# Patient Record
Sex: FEMALE | Race: WHITE | Employment: OTHER | URBAN - METROPOLITAN AREA
[De-identification: names, ages, dates, MRNs, and addresses within clinical notes are randomized per-mention and may not be internally consistent; named-entity substitution may affect disease eponyms.]

---

## 2017-06-25 ENCOUNTER — HOSPITAL ENCOUNTER (INPATIENT)
Age: 77
LOS: 4 days | Discharge: HOME | DRG: 369 | End: 2017-06-29
Attending: EMERGENCY MEDICINE | Admitting: INTERNAL MEDICINE
Payer: MEDICARE

## 2017-06-25 DIAGNOSIS — D64.9 ANEMIA, UNSPECIFIED TYPE: ICD-10-CM

## 2017-06-25 DIAGNOSIS — K92.2 GASTROINTESTINAL HEMORRHAGE, UNSPECIFIED GASTROINTESTINAL HEMORRHAGE TYPE: Primary | ICD-10-CM

## 2017-06-25 PROBLEM — N19 ACUTE PRERENAL AZOTEMIA: Status: ACTIVE | Noted: 2017-06-25

## 2017-06-25 PROBLEM — K92.1 MELENA: Status: ACTIVE | Noted: 2017-06-25

## 2017-06-25 PROBLEM — I95.9 HYPOTENSION: Status: ACTIVE | Noted: 2017-06-25

## 2017-06-25 PROBLEM — K92.0 HEMATEMESIS: Status: ACTIVE | Noted: 2017-06-25

## 2017-06-25 LAB
ALBUMIN SERPL BCP-MCNC: 2.5 G/DL (ref 3.2–4.6)
ALBUMIN SERPL BCP-MCNC: 2.8 G/DL (ref 3.2–4.6)
ALBUMIN/GLOB SERPL: 0.8 {RATIO} (ref 1.2–3.5)
ALBUMIN/GLOB SERPL: 0.9 {RATIO} (ref 1.2–3.5)
ALP SERPL-CCNC: 47 U/L (ref 50–136)
ALP SERPL-CCNC: 59 U/L (ref 50–136)
ALT SERPL-CCNC: 13 U/L (ref 12–65)
ALT SERPL-CCNC: 15 U/L (ref 12–65)
AMORPH CRY URNS QL MICRO: ABNORMAL
ANION GAP BLD CALC-SCNC: 6 MMOL/L (ref 7–16)
ANION GAP BLD CALC-SCNC: 8 MMOL/L (ref 7–16)
AST SERPL W P-5'-P-CCNC: 16 U/L (ref 15–37)
AST SERPL W P-5'-P-CCNC: 19 U/L (ref 15–37)
BACTERIA SPEC CULT: NORMAL
BACTERIA URNS QL MICRO: 0 /HPF
BASOPHILS # BLD AUTO: 0 K/UL (ref 0–0.2)
BASOPHILS # BLD: 0 % (ref 0–2)
BILIRUB SERPL-MCNC: 0.4 MG/DL (ref 0.2–1.1)
BILIRUB SERPL-MCNC: 0.4 MG/DL (ref 0.2–1.1)
BUN SERPL-MCNC: 27 MG/DL (ref 8–23)
BUN SERPL-MCNC: 30 MG/DL (ref 8–23)
CALCIUM SERPL-MCNC: 7.4 MG/DL (ref 8.3–10.4)
CALCIUM SERPL-MCNC: 7.9 MG/DL (ref 8.3–10.4)
CASTS URNS QL MICRO: 0 /LPF
CHLORIDE SERPL-SCNC: 107 MMOL/L (ref 98–107)
CHLORIDE SERPL-SCNC: 111 MMOL/L (ref 98–107)
CO2 SERPL-SCNC: 25 MMOL/L (ref 21–32)
CO2 SERPL-SCNC: 29 MMOL/L (ref 21–32)
CREAT SERPL-MCNC: 0.66 MG/DL (ref 0.6–1)
CREAT SERPL-MCNC: 0.76 MG/DL (ref 0.6–1)
CRYSTALS URNS QL MICRO: 0 /LPF
DIFFERENTIAL METHOD BLD: ABNORMAL
EOSINOPHIL # BLD: 0.1 K/UL (ref 0–0.8)
EOSINOPHIL NFR BLD: 1 % (ref 0.5–7.8)
EPI CELLS #/AREA URNS HPF: ABNORMAL /HPF
ERYTHROCYTE [DISTWIDTH] IN BLOOD BY AUTOMATED COUNT: 18 % (ref 11.9–14.6)
GLOBULIN SER CALC-MCNC: 2.8 G/DL (ref 2.3–3.5)
GLOBULIN SER CALC-MCNC: 3.7 G/DL (ref 2.3–3.5)
GLUCOSE SERPL-MCNC: 93 MG/DL (ref 65–100)
GLUCOSE SERPL-MCNC: 98 MG/DL (ref 65–100)
HCT VFR BLD AUTO: 18.3 % (ref 35.8–46.3)
HCT VFR BLD AUTO: 24.6 % (ref 35.8–46.3)
HGB BLD-MCNC: 5.9 G/DL (ref 11.7–15.4)
HGB BLD-MCNC: 7.9 G/DL (ref 11.7–15.4)
IMM GRANULOCYTES # BLD: 0.1 K/UL (ref 0–0.5)
IMM GRANULOCYTES NFR BLD AUTO: 0.9 % (ref 0–5)
INR PPP: 1.1 (ref 0.9–1.2)
LIPASE SERPL-CCNC: 133 U/L (ref 73–393)
LYMPHOCYTES # BLD AUTO: 18 % (ref 13–44)
LYMPHOCYTES # BLD: 1.7 K/UL (ref 0.5–4.6)
MCH RBC QN AUTO: 27.5 PG (ref 26.1–32.9)
MCHC RBC AUTO-ENTMCNC: 32.1 G/DL (ref 31.4–35)
MCV RBC AUTO: 85.7 FL (ref 79.6–97.8)
MONOCYTES # BLD: 0.8 K/UL (ref 0.1–1.3)
MONOCYTES NFR BLD AUTO: 9 % (ref 4–12)
MUCOUS THREADS URNS QL MICRO: 0 /LPF
NEUTS SEG # BLD: 6.8 K/UL (ref 1.7–8.2)
NEUTS SEG NFR BLD AUTO: 71 % (ref 43–78)
PLATELET # BLD AUTO: 279 K/UL (ref 150–450)
PMV BLD AUTO: 9.8 FL (ref 10.8–14.1)
POTASSIUM SERPL-SCNC: 4.2 MMOL/L (ref 3.5–5.1)
POTASSIUM SERPL-SCNC: 4.5 MMOL/L (ref 3.5–5.1)
PROT SERPL-MCNC: 5.3 G/DL (ref 6.3–8.2)
PROT SERPL-MCNC: 6.5 G/DL (ref 6.3–8.2)
PROTHROMBIN TIME: 11.7 SEC (ref 9.6–12)
RBC # BLD AUTO: 2.87 M/UL (ref 4.05–5.25)
RBC #/AREA URNS HPF: 0 /HPF
SERVICE CMNT-IMP: NORMAL
SODIUM SERPL-SCNC: 142 MMOL/L (ref 136–145)
SODIUM SERPL-SCNC: 144 MMOL/L (ref 136–145)
WBC # BLD AUTO: 9.5 K/UL (ref 4.3–11.1)
WBC URNS QL MICRO: ABNORMAL /HPF

## 2017-06-25 PROCEDURE — 81015 MICROSCOPIC EXAM OF URINE: CPT | Performed by: EMERGENCY MEDICINE

## 2017-06-25 PROCEDURE — 74011250636 HC RX REV CODE- 250/636: Performed by: INTERNAL MEDICINE

## 2017-06-25 PROCEDURE — 80053 COMPREHEN METABOLIC PANEL: CPT | Performed by: EMERGENCY MEDICINE

## 2017-06-25 PROCEDURE — 85610 PROTHROMBIN TIME: CPT | Performed by: INTERNAL MEDICINE

## 2017-06-25 PROCEDURE — 65610000001 HC ROOM ICU GENERAL

## 2017-06-25 PROCEDURE — 85018 HEMOGLOBIN: CPT | Performed by: INTERNAL MEDICINE

## 2017-06-25 PROCEDURE — 85025 COMPLETE CBC W/AUTO DIFF WBC: CPT | Performed by: EMERGENCY MEDICINE

## 2017-06-25 PROCEDURE — 86923 COMPATIBILITY TEST ELECTRIC: CPT | Performed by: INTERNAL MEDICINE

## 2017-06-25 PROCEDURE — 80053 COMPREHEN METABOLIC PANEL: CPT | Performed by: INTERNAL MEDICINE

## 2017-06-25 PROCEDURE — 74011250636 HC RX REV CODE- 250/636: Performed by: EMERGENCY MEDICINE

## 2017-06-25 PROCEDURE — P9016 RBC LEUKOCYTES REDUCED: HCPCS | Performed by: INTERNAL MEDICINE

## 2017-06-25 PROCEDURE — 86900 BLOOD TYPING SEROLOGIC ABO: CPT | Performed by: NURSE PRACTITIONER

## 2017-06-25 PROCEDURE — 36415 COLL VENOUS BLD VENIPUNCTURE: CPT | Performed by: INTERNAL MEDICINE

## 2017-06-25 PROCEDURE — 83690 ASSAY OF LIPASE: CPT | Performed by: EMERGENCY MEDICINE

## 2017-06-25 PROCEDURE — 86900 BLOOD TYPING SEROLOGIC ABO: CPT | Performed by: INTERNAL MEDICINE

## 2017-06-25 PROCEDURE — 99284 EMERGENCY DEPT VISIT MOD MDM: CPT | Performed by: EMERGENCY MEDICINE

## 2017-06-25 PROCEDURE — 77010033678 HC OXYGEN DAILY

## 2017-06-25 PROCEDURE — 96366 THER/PROPH/DIAG IV INF ADDON: CPT | Performed by: EMERGENCY MEDICINE

## 2017-06-25 PROCEDURE — 96365 THER/PROPH/DIAG IV INF INIT: CPT | Performed by: EMERGENCY MEDICINE

## 2017-06-25 PROCEDURE — 96375 TX/PRO/DX INJ NEW DRUG ADDON: CPT | Performed by: EMERGENCY MEDICINE

## 2017-06-25 PROCEDURE — 87641 MR-STAPH DNA AMP PROBE: CPT | Performed by: INTERNAL MEDICINE

## 2017-06-25 PROCEDURE — 86923 COMPATIBILITY TEST ELECTRIC: CPT | Performed by: NURSE PRACTITIONER

## 2017-06-25 PROCEDURE — 77030013131 HC IV BLD ST ICUM -A

## 2017-06-25 PROCEDURE — 77030021668 HC NEB PREFIL KT VYRM -A

## 2017-06-25 PROCEDURE — C9113 INJ PANTOPRAZOLE SODIUM, VIA: HCPCS | Performed by: EMERGENCY MEDICINE

## 2017-06-25 PROCEDURE — 81003 URINALYSIS AUTO W/O SCOPE: CPT | Performed by: EMERGENCY MEDICINE

## 2017-06-25 PROCEDURE — 36430 TRANSFUSION BLD/BLD COMPNT: CPT

## 2017-06-25 PROCEDURE — 74011250637 HC RX REV CODE- 250/637: Performed by: INTERNAL MEDICINE

## 2017-06-25 RX ORDER — SODIUM CHLORIDE 9 MG/ML
250 INJECTION, SOLUTION INTRAVENOUS AS NEEDED
Status: DISCONTINUED | OUTPATIENT
Start: 2017-06-25 | End: 2017-06-25 | Stop reason: SDUPTHER

## 2017-06-25 RX ORDER — LEVOTHYROXINE SODIUM 50 UG/1
50 TABLET ORAL
Status: DISCONTINUED | OUTPATIENT
Start: 2017-06-26 | End: 2017-06-29 | Stop reason: HOSPADM

## 2017-06-25 RX ORDER — MORPHINE SULFATE 2 MG/ML
2 INJECTION, SOLUTION INTRAMUSCULAR; INTRAVENOUS
Status: COMPLETED | OUTPATIENT
Start: 2017-06-25 | End: 2017-06-25

## 2017-06-25 RX ORDER — HYDROMORPHONE HYDROCHLORIDE 4 MG/1
4 TABLET ORAL
Status: DISCONTINUED | OUTPATIENT
Start: 2017-06-25 | End: 2017-06-26

## 2017-06-25 RX ORDER — SODIUM CHLORIDE 9 MG/ML
250 INJECTION, SOLUTION INTRAVENOUS AS NEEDED
Status: DISCONTINUED | OUTPATIENT
Start: 2017-06-25 | End: 2017-06-29 | Stop reason: HOSPADM

## 2017-06-25 RX ORDER — SODIUM CHLORIDE 0.9 % (FLUSH) 0.9 %
5-10 SYRINGE (ML) INJECTION EVERY 8 HOURS
Status: DISCONTINUED | OUTPATIENT
Start: 2017-06-25 | End: 2017-06-29 | Stop reason: HOSPADM

## 2017-06-25 RX ORDER — ONDANSETRON 2 MG/ML
4 INJECTION INTRAMUSCULAR; INTRAVENOUS
Status: DISCONTINUED | OUTPATIENT
Start: 2017-06-25 | End: 2017-06-29 | Stop reason: HOSPADM

## 2017-06-25 RX ORDER — SODIUM CHLORIDE 0.9 % (FLUSH) 0.9 %
5-10 SYRINGE (ML) INJECTION AS NEEDED
Status: DISCONTINUED | OUTPATIENT
Start: 2017-06-25 | End: 2017-06-29 | Stop reason: HOSPADM

## 2017-06-25 RX ORDER — ATORVASTATIN CALCIUM 40 MG/1
40 TABLET, FILM COATED ORAL DAILY
Status: DISCONTINUED | OUTPATIENT
Start: 2017-06-25 | End: 2017-06-29 | Stop reason: HOSPADM

## 2017-06-25 RX ORDER — ONDANSETRON 2 MG/ML
4 INJECTION INTRAMUSCULAR; INTRAVENOUS
Status: COMPLETED | OUTPATIENT
Start: 2017-06-25 | End: 2017-06-25

## 2017-06-25 RX ORDER — HYDROMORPHONE HYDROCHLORIDE 4 MG/1
4 TABLET ORAL 4 TIMES DAILY
COMMUNITY
End: 2020-05-18

## 2017-06-25 RX ORDER — PREDNISONE 5 MG/1
5 TABLET ORAL
Status: DISCONTINUED | OUTPATIENT
Start: 2017-06-26 | End: 2017-06-29 | Stop reason: HOSPADM

## 2017-06-25 RX ORDER — METOCLOPRAMIDE HYDROCHLORIDE 5 MG/ML
10 INJECTION INTRAMUSCULAR; INTRAVENOUS
Status: COMPLETED | OUTPATIENT
Start: 2017-06-25 | End: 2017-06-25

## 2017-06-25 RX ORDER — SODIUM CHLORIDE 9 MG/ML
100 INJECTION, SOLUTION INTRAVENOUS CONTINUOUS
Status: DISCONTINUED | OUTPATIENT
Start: 2017-06-25 | End: 2017-06-29 | Stop reason: HOSPADM

## 2017-06-25 RX ADMIN — SODIUM CHLORIDE 1000 ML: 900 INJECTION, SOLUTION INTRAVENOUS at 14:19

## 2017-06-25 RX ADMIN — Medication 2 MG: at 15:44

## 2017-06-25 RX ADMIN — SODIUM CHLORIDE 250 ML: 900 INJECTION, SOLUTION INTRAVENOUS at 21:58

## 2017-06-25 RX ADMIN — ONDANSETRON 4 MG: 2 INJECTION INTRAMUSCULAR; INTRAVENOUS at 20:25

## 2017-06-25 RX ADMIN — Medication 10 ML: at 16:39

## 2017-06-25 RX ADMIN — SODIUM CHLORIDE 200 ML/HR: 900 INJECTION, SOLUTION INTRAVENOUS at 19:00

## 2017-06-25 RX ADMIN — SODIUM CHLORIDE 1000 ML: 900 INJECTION, SOLUTION INTRAVENOUS at 15:52

## 2017-06-25 RX ADMIN — SODIUM CHLORIDE 80 MG: 900 INJECTION, SOLUTION INTRAVENOUS at 14:09

## 2017-06-25 RX ADMIN — Medication 10 ML: at 22:23

## 2017-06-25 RX ADMIN — HYDROMORPHONE HYDROCHLORIDE 4 MG: 4 TABLET ORAL at 23:19

## 2017-06-25 RX ADMIN — SODIUM CHLORIDE 200 ML/HR: 900 INJECTION, SOLUTION INTRAVENOUS at 16:35

## 2017-06-25 RX ADMIN — ONDANSETRON 4 MG: 2 INJECTION INTRAMUSCULAR; INTRAVENOUS at 15:43

## 2017-06-25 RX ADMIN — METOCLOPRAMIDE 10 MG: 5 INJECTION, SOLUTION INTRAMUSCULAR; INTRAVENOUS at 14:19

## 2017-06-25 NOTE — ED NOTES
Type and cross sent to lab. Patient states that she is more nauseated after Zofran and morphine. Patients BP 87/43. Dr June Cruz aware. Received verbal orders for 1000 mL normal saline bolus.

## 2017-06-25 NOTE — ED TRIAGE NOTES
Patient from home with n/v with bloody emesis and dark stools since 8 am. Hx of ulcers. 4 mg Zofran IV and 600 ml normal saline. /70. Patient on arrival reports nausea.

## 2017-06-25 NOTE — CONSULTS
[facility] Consult Note    Antony Arrieta [place] 1940       Primary GI Physician: new    Referring Physician:  Dr Barbarann Kussmaul Date:  6/25/2017    Admit Date:  6/25/2017    Chief Complaint:  Hematemesis and melena    Subjective:     History of Present Illness:  Patient is a 67 y.o. female seen in consultation at the request of Dr. Luis Fernando Wallace for evaluation of hematemesis and melena. She presented to the ER today with hematemesis which began today after the onset of melena yesterday. She notes a chronic hx of constipation for which she often takes large doses of Dulcolax; this is turn causes abdominal cramping and often, nausea and vomiting. She noted loose black stools yesterday, despite no dose of Dulcolax, and today began to vomit with red blood noted with initial emesis. She reports chronic pain and denies use of nsaids for this but does note daily use of steroids for pulmonary fibrosis. Her breathing does not seem worsened after the emesis but she does report weakness and chills. She was evaluated by the hospitalist in the ER with hgb 7.9 on arrival.  She has had no emesis or melena since arrival.  She is to receive transfusion of PRBC and is npo. She has been placed on a Protonix gtt. She denies prior hx of gi bleed. PMH:  Chronic back pain  Pulmonary fibrosis  Heart murmur  Glaucoma  Hyperlipidemia  GERD  Thyroid disease  Chronic constipation    PSH:  Past Surgical History:   Procedure Laterality Date   Abdirizak Settle SURGERY  2013    HX BACK SURGERY  July, 2015    HX CHOLECYSTECTOMY      HX HYSTERECTOMY      [facility] OTHER SURGICAL  2011    left thoroscopy--[facility]IP       Allergies: Allergies   Allergen Reactions    Codeine Nausea Only    Motrin [Ibuprofen] Nausea Only       Home Medications:  Prior to Admission medications    Medication Sig Start Date End Date Taking?  Authorizing Provider   HYDROmorphone (DILAUDID) 4 mg tablet Take 4 mg by mouth every three (3) hours as needed for Pain. Yes Sugey Gallegos MD   predniSONE (DELTASONE) 5 mg tablet Take 1 Tab by mouth daily (with breakfast). Starting Feb 14, 5mg daily x 1 week, Starting Feb 21, 5mg alternating with 2.5mg daily x 1 week; Starting Feb 28, 5mg every other day until seen back in the office. 2/15/16  Yes Ayush Victoria MD   pantoprazole (PROTONIX) 40 mg tablet Take 40 mg by mouth daily. Yes Historical Provider   levothyroxine (SYNTHROID) 75 mcg tablet Take 50 mcg by mouth Daily (before breakfast). Yes Historical Provider   atorvastatin (LIPITOR) 10 mg tablet Take  by mouth daily. Yes Historical Provider   bisacodyl (DULCOLAX, BISACODYL,) 5 mg EC tablet Take 5 mg by mouth daily as needed for Constipation. Yes Historical Provider   predniSONE (DELTASONE) 10 mg tablet Take 2 tablet daily for one week, then 1-1/2 tablet daily until she is seen 10/28/15   Page President, MD       Hospital Medications:  Current Facility-Administered Medications   Medication Dose Route Frequency    pantoprazole (PROTONIX) 80 mg in 0.9% sodium chloride 250 mL infusion  80 mg IntraVENous CONTINUOUS     Current Outpatient Prescriptions   Medication Sig    HYDROmorphone (DILAUDID) 4 mg tablet Take 4 mg by mouth every three (3) hours as needed for Pain.  predniSONE (DELTASONE) 5 mg tablet Take 1 Tab by mouth daily (with breakfast). Starting Feb 14, 5mg daily x 1 week, Starting Feb 21, 5mg alternating with 2.5mg daily x 1 week; Starting Feb 28, 5mg every other day until seen back in the office.  pantoprazole (PROTONIX) 40 mg tablet Take 40 mg by mouth daily.  levothyroxine (SYNTHROID) 75 mcg tablet Take 50 mcg by mouth Daily (before breakfast).  atorvastatin (LIPITOR) 10 mg tablet Take  by mouth daily.  bisacodyl (DULCOLAX, BISACODYL,) 5 mg EC tablet Take 5 mg by mouth daily as needed for Constipation.     predniSONE (DELTASONE) 10 mg tablet Take 2 tablet daily for one week, then 1-1/2 tablet daily until she is seen       Social History:  Social History   Substance Use Topics    Smoking status: Never Smoker    Smokeless tobacco: Never Used    Alcohol use No           Family History:  Family History   Problem Relation Age of Onset    Stroke Father        Review of Systems:  A detailed 10 system ROS is obtained, with pertinent positives as listed above. All others are negative. Diet:  npo    Objective:     Physical Exam:  Vitals:  Visit Vitals    /50    Pulse 73    Temp 97.9 °F (36.6 °C)    Resp 16    Ht 5' 1\" (1.549 m)    Wt 54.4 kg (120 lb)    SpO2 94%    BMI 22.67 kg/m2     Gen:  Pt is alert, cooperative, no acute distress  Skin:  Extremities and face reveal no rashes. Pale, sallow appearing. HEENT: Sclerae anicteric. No abnormal pigmentation of the lips. The neck is supple. Cardiovascular: Regular rate and rhythm. Audible murmurs. Respiratory:  Comfortable breathing with no accessory muscle use. Decreased breath sounds anteriorly with no wheezes, rales, or rhonchi. GI:  Abdomen nondistended, soft, and nontender. Normal active bowel sounds. No enlargement of the liver or spleen. No masses palpable. Rectal:  Deferred  Musculoskeletal:  No pitting edema of the lower legs. Neurological:  Gross memory appears intact. Patient is alert and oriented. Psychiatric:  Mood appears appropriate with judgement intact. Lymphatic:  No cervical or supraclavicular adenopathy.     Laboratory:    Recent Labs      06/25/17   1345   WBC  9.5   HGB  7.9*   HCT  24.6*   PLT  279   MCV  85.7   NA  142   K  4.2   CL  107   CO2  29   BUN  30*   CREA  0.76   CA  7.9*   GLU  98   AP  59   SGOT  16   ALT  15   TBILI  0.4   ALB  2.8*   TP  6.5   LPSE  133          Assessment:     Principal Problem:    GI bleed (6/25/2017)    Active Problems:    Hematemesis (6/25/2017)      Acute upper gastrointestinal bleeding (6/25/2017)      Melena (6/25/2017)      Hypotension (6/25/2017)        Plan:     66 yo female is seen in consultation for evaluation of a one day hx of melena, followed by onset of hematemesis today. She presented to the ER with weakness and was noted to have hgb 7.9. She denies hx of gib in the past and denies use of nsaids but does report daily steroid use of hx of pulmonary fibrosis, followed by a pulmonologist at [facility]. She has been typed and crossed for PRBC transfusion and Protonix gtt has been started. Etiology of bleeding may be Jayleen-De León tear but mucosal ulceration secondary to steroids is more likely. 1.  Agree with admission to the ICU, given hypotension with bleeding  2. Serial hgb, transfuse as needed. Will keep PRBC on hold in blood bank. 3.  Continue Protonix gtt  4. Will need EGD in evaluation; will discuss timing with Dr Mohsen Rodriguez     Patient is seen and examined in collaboration with Dr. Nadege Adkins. Assessment and plan as per Dr. Mohsen Rodriguez.   Dallas Mukherjee NP

## 2017-06-25 NOTE — IP AVS SNAPSHOT
[address] 
[phone] Patient: Leela Herbert MRN: [id number] [facility]:[birth year] You are allergic to the following Allergen Reactions Codeine Nausea Only Motrin (Ibuprofen) Nausea Only Recent Documentation Height Weight Breastfeeding? BMI Smoking Status 1.549 m 54 kg No 22.49 kg/m2 Never Smoker Emergency Contacts Name Discharge Info Relation Home Work Mobile Cleave Abigail  Spouse [3] [phone] Chiki Negrete  Spouse [3] [phone] About your hospitalization You were admitted on:  June 25, 2017 You last received care in the:  [facility] 6 MED SURG You were discharged on:  June 29, 2017 Unit phone number:  [phone] Why you were hospitalized Your primary diagnosis was:  Gi Bleed Your diagnoses also included:  Hematemesis, Acute Upper Gastrointestinal Bleeding, Melena, Hypotension, Acute Prerenal Azotemia Providers Seen During Your Hospitalizations Provider Role Specialty Primary office phone Vimal Stephens MD Attending Provider Emergency Medicine [phone] Colletta Pelton, DO Attending Provider Internal Medicine [phone] Your Primary Care Physician (PCP) Primary Care Physician Office Phone Office Fax MENA BLOOD ** None ** ** None ** Follow-up Information Follow up With Details Comments Contact Info LARRY Hoskins  On 7/11/2017 at 11:00 [facility] gastroenterology [phone] Current Discharge Medication List  
  
START taking these medications Dose & Instructions Dispensing Information Comments Morning Noon Evening Bedtime  
 docusate sodium 100 mg capsule Commonly known as:  [employer] Your next dose is: Today with supper Dose:  100 mg Take 1 Cap by mouth two (2) times a day for 90 days. Indications: constipation Quantity:  60 Cap Refills:  2  
     
   
   
  
   
  
 ondansetron 8 mg disintegrating tablet Commonly known as:  ZOFRAN ODT Your next dose is:  As needed Dose:  8 mg Take 1 Tab by mouth every eight (8) hours as needed for Nausea. Quantity:  21 Tab Refills:  0 CONTINUE these medications which have CHANGED Dose & Instructions Dispensing Information Comments Morning Noon Evening Bedtime  
 pantoprazole 40 mg tablet Commonly known as:  PROTONIX What changed:   
- when to take this 
- additional instructions Your next dose is: Today before supper Dose:  40 mg Take 1 Tab by mouth two (2) times a day. Take twice a day for 8 weeks, then drop back down to daily. Indications: Upper GI Bleed Quantity:  60 Tab Refills:  0  
     
   
   
  
   
  
 predniSONE 5 mg tablet Commonly known as:  Krystin [place] What changed:  Another medication with the same name was removed. Continue taking this medication, and follow the directions you see here. Your next dose is:  6/30 Dose:  5 mg Take 1 Tab by mouth daily (with breakfast). Starting Feb 14, 5mg daily x 1 week, Starting Feb 21, 5mg alternating with 2.5mg daily x 1 week; Starting Feb 28, 5mg every other day until seen back in the office. Quantity:  30 Tab Refills:  0 CONTINUE these medications which have NOT CHANGED Dose & Instructions Dispensing Information Comments Morning Noon Evening Bedtime DILAUDID 4 mg tablet Generic drug:  HYDROmorphone Your next dose is:  As needed Dose:  4 mg Take 4 mg by mouth every three (3) hours as needed for Pain. Refills:  0 DULCOLAX (BISACODYL) 5 mg EC tablet Generic drug:  bisacodyl Your next dose is:  As needed Dose:  5 mg Take 5 mg by mouth daily as needed for Constipation. Refills:  0  
     
   
   
   
  
 levothyroxine 75 mcg tablet Commonly known as:  SYNTHROID Your next dose is:  6/30 Dose:  50 mcg Take 50 mcg by mouth Daily (before breakfast). Refills:  0 LIPITOR 10 mg tablet Generic drug:  atorvastatin Your next dose is:  6/30 Take  by mouth daily. Refills:  0 Where to Get Your Medications Information on where to get these meds will be given to you by the nurse or doctor. ! Ask your nurse or doctor about these medications  
  docusate sodium 100 mg capsule  
 ondansetron 8 mg disintegrating tablet  
 pantoprazole 40 mg tablet Discharge Instructions DISCHARGE SUMMARY from Nurse The following personal items are in your possession at time of discharge: 
 
Dental Appliances: At home Visual Aid: Glasses, At bedside Home Medications: None Jewelry: Ring (7 rings) Clothing: Sent home Other Valuables: Cell Phone, Eyeglasses PATIENT INSTRUCTIONS: 
 
 
F-face looks uneven A-arms unable to move or move unevenly S-speech slurred or non-existent T-time-call 911 as soon as signs and symptoms begin-DO NOT go Back to bed or wait to see if you get better-TIME IS BRAIN. Warning Signs of HEART ATTACK Call 911 if you have these symptoms: 
? Chest discomfort. Most heart attacks involve discomfort in the center of the chest that lasts more than a few minutes, or that goes away and comes back. It can feel like uncomfortable pressure, squeezing, fullness, or pain. ? Discomfort in other areas of the upper body.  Symptoms can include pain or discomfort in one or both arms, the back, neck, jaw, or stomach. ? Shortness of breath with or without chest discomfort. ? Other signs may include breaking out in a cold sweat, nausea, or lightheadedness. Don't wait more than five minutes to call [address]! Fast action can save your life. Calling 911 is almost always the fastest way to get lifesaving treatment. Emergency Medical Services staff can begin treatment when they arrive  up to an hour sooner than if someone gets to the hospital by car. The discharge information has been reviewed with the patient. The patient verbalized understanding. Discharge medications reviewed with the patient and appropriate educational materials and side effects teaching were provided. Gastrointestinal Bleeding: Care Instructions Your Care Instructions The digestive or gastrointestinal tract goes from the mouth to the anus. It is often called the GI tract. Bleeding can happen anywhere in the GI tract. It may be caused by an ulcer, an infection, or cancer. It may also be caused by medicines such as aspirin or ibuprofen. Light bleeding may not cause any symptoms at first. But if you continue to bleed for a while, you may feel very weak or tired. Sudden, heavy bleeding means you need to see a doctor right away. This kind of bleeding can be very dangerous. But it can usually be cured or controlled. The doctor may do some tests to find the cause of your bleeding. Follow-up care is a key part of your treatment and safety. Be sure to make and go to all appointments, and call your doctor if you are having problems. It's also a good idea to know your test results and keep a list of the medicines you take. How can you care for yourself at home? · Be safe with medicines. Take your medicines exactly as prescribed. Call your doctor if you think you are having a problem with your medicine.  You will get more details on the specific medicines your doctor prescribes. · Do not take aspirin or other anti-inflammatory medicines, such as naproxen (Aleve) or ibuprofen (Advil, Motrin), without talking to your doctor first. Ask your doctor if it is okay to use acetaminophen (Tylenol). · Do not drink alcohol. · The bleeding may make you lose iron. So it's important to eat foods that have a lot of iron. These include red meat, shellfish, poultry, and eggs. They also include beans, raisins, whole-grain breads, and leafy green vegetables. If you want help planning meals, you can make an appointment with a dietitian. When should you call for help? Call 911 anytime you think you may need emergency care. For example, call if: 
· You have sudden, severe belly pain. · You vomit blood or what looks like coffee grounds. · You passed out (lost consciousness). · Your stools are maroon or very bloody. Call your doctor now or seek immediate medical care if: 
· You are dizzy or lightheaded, or you feel like you may faint. · Your stools are black and look like tar, or they have streaks of blood. · You have belly pain. · You vomit or have nausea. · You have trouble swallowing, or it hurts when you swallow. Watch closely for changes in your health, and be sure to contact your doctor if: 
· You do not get better as expected. Where can you learn more? Go to http://raul-forest.info/. Enter I179 in the search box to learn more about \"Gastrointestinal Bleeding: Care Instructions. \" Current as of: March 20, 2017 Content Version: 11.3 © 2095-1160 [employer]. Care instructions adapted under license by [employer] (which disclaims liability or warranty for this information). If you have questions about a medical condition or this instruction, always ask your healthcare professional. [address] any warranty or liability for your use of this information. Discharge Orders None VeriTainer Announcement We are excited to announce that we are making your provider's discharge notes available to you in VeriTainer. You will see these notes when they are completed and signed by the physician that discharged you from your recent hospital stay. If you have any questions or concerns about any information you see in VeriTainer, please call the Health Information Department where you were seen or reach out to your Primary Care Provider for more information about your plan of care. Introducing [facility]! Abbey Vaughan introduces VeriTainer patient portal. Now you can access parts of your medical record, email your doctor's office, and request medication refills online. 1. In your internet browser, go to [email or web address] 2. Click on the First Time User? Click Here link in the Sign In box. You will see the New Member Sign Up page. 3. Enter your VeriTainer Access Code exactly as it appears below. You will not need to use this code after youve completed the sign-up process. If you do not sign up before the expiration date, you must request a new code. · VeriTainer Access Code: [email or web address] Expires: 9/23/2017  2:05 PM 
 
4. Enter the last four digits of your Social Security Number (xxxx) and Date of Birth (mm/dd/yyyy) as indicated and click Submit. You will be taken to the next sign-up page. 5. Create a VeriTainer ID. This will be your VeriTainer login ID and cannot be changed, so think of one that is secure and easy to remember. 6. Create a VeriTainer password. You can change your password at any time. 7. Enter your Password Reset Question and Answer. This can be used at a later time if you forget your password. 8. Enter your e-mail address. You will receive e-mail notification when new information is available in [address] 9. Click Sign Up. You can now view and download portions of your medical record. 10. Click the Download Summary menu link to download a portable copy of your medical information. If you have questions, please visit the Frequently Asked Questions section of the [employer] website. Remember, MyChart is NOT to be used for urgent needs. For medical emergencies, dial 911. Now available from your iPhone and Android! General Information Please provide this summary of care documentation to your next provider. Patient Signature:  ____________________________________________________________ Date:  ____________________________________________________________  
  
[facility] Provider Signature:  ____________________________________________________________ Date:  ____________________________________________________________

## 2017-06-25 NOTE — PROGRESS NOTES
Went to blood bank to  blood. Arm band number incorrect on blood label. T&C to be recollected and send down for rematch due to mislabeling.

## 2017-06-25 NOTE — PROGRESS NOTES
Maroon stool mixed with urine. Stool was around 50cc and urine was around 200cc. Rafael Wilder MD in to see and blood unit has just become allocated as available.

## 2017-06-25 NOTE — PROGRESS NOTES
Admission assessment completed. Patient is alert and oriented x4, able to verbalize needs. Respirations even and unlabored on room air, O2 sat on arrival was 87-88%, patient placed on 3L NC. Patient reporting mild abdominal discomfort. Patient placed on bedpan and had large dark stool. Database and consent forms completed with patient's  and son. Lines:  Peripheral IVs    Drips:  NS @ 200ml/hr  Protonix @ 25ml/hr    Drains:  None    Airway:  3L NC    Skin:   No pressure sores or skin breakdown noted. Sacrum intact, allevyn placed for protection.

## 2017-06-25 NOTE — Clinical Note
Status[de-identified] Inpatient [101] Type of Bed: Telemetry Remote [29] Inpatient Hospitalization Certified Necessary for the Following Reasons: 3. Patient receiving treatment that can only be provided in an inpatient setting (further clarification in H&P documentation) Admitting Diagnosis: GI bleed [564847] Admitting Physician: Brian Sosa [[id number]] Attending Physician: Brian Sosa [[id number]] Estimated Length of Stay: > or = to 2 Midnights Discharge Plan[de-identified] Extended Care Facility (e.g. Adult Home, Nursing Home, etc.)

## 2017-06-25 NOTE — PROGRESS NOTES
TRANSFER - IN REPORT:    Verbal report received from Mark, [address] on [facility]  being received from ER for routine progression of care      Report consisted of patients Situation, Background, Assessment and   Recommendations(SBAR). Information from the following report(s) ED Summary was reviewed with the receiving nurse. Opportunity for questions and clarification was provided. Assessment completed upon patients arrival to unit and care assumed.

## 2017-06-25 NOTE — ED NOTES
TRANSFER - OUT REPORT:    Verbal report given to Mary RN(name) on Dorota Aviles  [facility] ICU(unit) for routine progression of care       Report consisted of patients Situation, Background, Assessment and   Recommendations(SBAR). Information from the following report(s) ED Summary was reviewed with the receiving nurse. Lines:   Peripheral IV 06/25/17 Left Forearm (Active)        Opportunity for questions and clarification was provided.       Patient transported with:   Monitor  Registered Nurse   Medications from pharmacy

## 2017-06-25 NOTE — H&P
HOSPITALIST H&P      NAME:  Codi Singh   Age:  67 y.o.  :   1940   MRN:   [id number]    PCP: Merrill Costello NP    Attending MD: Swapna Sutherland DO    Treatment Team: Attending Provider: Gage Camejo MD; Primary Nurse: Aquilino Grande; Consulting Provider: Sarah Pruitt MD    HPI:     Codi Singh is a 67year old with Idiopathic Pulmonary Fibrosis (on chronic Prednisone & 3LNC @ night), hypothyroidism, chronic back pain (on chronic narcotics), & chronic constipation presented to the ER with one day of melena and 6-8 hours of hematemesis with associated near syncope and hypotension. The patient, and her son, who speaks good English, states that the patient has been \"fighting\" constipation due to narcotics for almost a year now. She has tried Linzess and other agents which did not help. She has been taking Dulcolax x 2-3 months and has had a hard time keeping it down with persistent vomiting. She had been doing fine despite the vomiting until last evening when she started having melena. Then when she woke up this morning and was in her kitchen, she became really light headed and almost passed out then started vomiting bilious liquid that turned to bright red blood so she came to the ER. Complete ROS done and is as stated in HPI or otherwise negative    No past medical history on file. Past Surgical History:   Procedure Laterality Date   [facility] SURGERY     [facility] BACK SURGERY      HX CHOLECYSTECTOMY      HX HYSTERECTOMY      HX OTHER SURGICAL  2011    left thoroscopy--[facility]IP        Prior to Admission Medications   Prescriptions Last Dose Informant Patient Reported? Taking? HYDROmorphone (DILAUDID) 4 mg tablet   Yes Yes   Sig: Take 4 mg by mouth every three (3) hours as needed for Pain. atorvastatin (LIPITOR) 10 mg tablet   Yes Yes   Sig: Take  by mouth daily.    bisacodyl (DULCOLAX, BISACODYL,) 5 mg EC tablet   Yes Yes   Sig: Take 5 mg by mouth daily as needed for Constipation. levothyroxine (SYNTHROID) 75 mcg tablet   Yes Yes   Sig: Take 50 mcg by mouth Daily (before breakfast). pantoprazole (PROTONIX) 40 mg tablet   Yes Yes   Sig: Take 40 mg by mouth daily. predniSONE (DELTASONE) 10 mg tablet   No No   Sig: Take 2 tablet daily for one week, then 1-1/2 tablet daily until she is seen   predniSONE (DELTASONE) 5 mg tablet   No Yes   Sig: Take 1 Tab by mouth daily (with breakfast). Starting Feb 14, 5mg daily x 1 week, Starting Feb 21, 5mg alternating with 2.5mg daily x 1 week; Starting Feb 28, 5mg every other day until seen back in the office. Facility-Administered Medications: None       Allergies   Allergen Reactions    Codeine Nausea Only    Motrin [Ibuprofen] Nausea Only        Social History   Substance Use Topics    Smoking status: Never Smoker    Smokeless tobacco: Never Used    Alcohol use No        Family History   Problem Relation Age of Onset    Stroke Father         Objective:       Visit Vitals    /50    Pulse 73    Temp 97.9 °F (36.6 °C)    Resp 16    Ht 5' 1\" (1.549 m)    Wt 54.4 kg (120 lb)    SpO2 94%    BMI 22.67 kg/m2        Temp (24hrs), Av.9 °F (36.6 °C), Min:97.9 °F (36.6 °C), Max:97.9 °F (36.6 °C)      Oxygen Therapy  O2 Sat (%): 94 % (17 1617)  Pulse via Oximetry: 74 beats per minute (17 1617)  O2 Device: Room air (17 1556)      Physical Exam:      General:    Alert, cooperative, no distress, appears stated age. Pale. Eyes:   PERRLA EOMI Anicteric  Head:   Normocephalic, without obvious abnormality, atraumatic. ENT:  Nares normal. No drainage. Lungs:   Clear to auscultation bilaterally. Heart:   Regular rate and rhythm,  4/5 EMILY. No JVD. Abdomen:   Soft, epigastric tender. Not distended. Bowel sounds normal.   MSK:  No edema. No clubbing or cyanosis. No deformities/lesions. Skin:     Texture, turgor normal. No rashes or lesions. No Jaundice.   Neurologic: Alert and oriented x 3, no focal deficits   Psychiatry:      No depression/anxiety. Mood congruent for context. Heme/Lymph/Immune: No echymoses or overt signs of bleeding. Data Review:   Recent Results (from the past 24 hour(s))   CBC WITH AUTOMATED DIFF    Collection Time: 06/25/17  1:45 PM   Result Value Ref Range    WBC 9.5 4.3 - 11.1 K/uL    RBC 2.87 (L) 4.05 - 5.25 M/uL    HGB 7.9 (L) 11.7 - 15.4 g/dL    HCT 24.6 (L) 35.8 - 46.3 %    MCV 85.7 79.6 - 97.8 FL    MCH 27.5 26.1 - 32.9 PG    MCHC 32.1 31.4 - 35.0 g/dL    RDW 18.0 (H) 11.9 - 14.6 %    PLATELET 236 357 - 051 K/uL    MPV 9.8 (L) 10.8 - 14.1 FL    DF AUTOMATED      NEUTROPHILS 71 43 - 78 %    LYMPHOCYTES 18 13 - 44 %    MONOCYTES 9 4.0 - 12.0 %    EOSINOPHILS 1 0.5 - 7.8 %    BASOPHILS 0 0.0 - 2.0 %    IMMATURE GRANULOCYTES 0.9 0.0 - 5.0 %    ABS. NEUTROPHILS 6.8 1.7 - 8.2 K/UL    ABS. LYMPHOCYTES 1.7 0.5 - 4.6 K/UL    ABS. MONOCYTES 0.8 0.1 - 1.3 K/UL    ABS. EOSINOPHILS 0.1 0.0 - 0.8 K/UL    ABS. BASOPHILS 0.0 0.0 - 0.2 K/UL    ABS. IMM. GRANS. 0.1 0.0 - 0.5 K/UL   METABOLIC PANEL, COMPREHENSIVE    Collection Time: 06/25/17  1:45 PM   Result Value Ref Range    Sodium 142 136 - 145 mmol/L    Potassium 4.2 3.5 - 5.1 mmol/L    Chloride 107 98 - 107 mmol/L    CO2 29 21 - 32 mmol/L    Anion gap 6 (L) 7 - 16 mmol/L    Glucose 98 65 - 100 mg/dL    BUN 30 (H) 8 - 23 MG/DL    Creatinine 0.76 0.6 - 1.0 MG/DL    GFR est AA >60 >60 ml/min/1.73m2    GFR est non-AA >60 >60 ml/min/1.73m2    Calcium 7.9 (L) 8.3 - 10.4 MG/DL    Bilirubin, total 0.4 0.2 - 1.1 MG/DL    ALT (SGPT) 15 12 - 65 U/L    AST (SGOT) 16 15 - 37 U/L    Alk.  phosphatase 59 50 - 136 U/L    Protein, total 6.5 6.3 - 8.2 g/dL    Albumin 2.8 (L) 3.2 - 4.6 g/dL    Globulin 3.7 (H) 2.3 - 3.5 g/dL    A-G Ratio 0.8 (L) 1.2 - 3.5     LIPASE    Collection Time: 06/25/17  1:45 PM   Result Value Ref Range    Lipase 133 73 - 393 U/L   URINE MICROSCOPIC    Collection Time: 06/25/17  2:52 PM   Result Value Ref Range    WBC 3-5 0 /hpf    RBC 0 0 /hpf    Epithelial cells 0-3 0 /hpf    Bacteria 0 0 /hpf    Casts 0 0 /lpf    Crystals, urine 0 0 /LPF    Amorphous Crystals 1+ (H) 0    Mucus 0 0 /lpf          Assessment and Plan:        Active Hospital Problems    Diagnosis Date Noted    GI bleed 06/25/2017    Hematemesis 06/25/2017    Acute upper gastrointestinal bleeding 06/25/2017    Melena 06/25/2017    Hypotension 06/25/2017    Acute prerenal azotemia 06/25/2017       PLAN  - Admit to ICU due to UGIB with hypotension  - Consult GI - spoke to them in ER  - Start normal saline @ 200 ml/hr  - Protonix gtt  - Transfuse PRBC  - Check H/H Q8H  - Check orthostatic vitals signs  - Continue Dilaudid  - Will need better constipation control - maybe Movantik? - per GI  - Continue Prednisone for IPF  - Continue Levothyroxine  - PT eval once out of ICU    Code Status: FULL CODE  DVT Prophylaxis: SCDs    Anticipated discharge: 3 days      Clara Reddy DO  4:10 PM

## 2017-06-25 NOTE — ED PROVIDER NOTES
HPI Comments: Patient presents with symptoms concerning for upper GI bleed, onset this morning. Patient has a history of long-term narcotic use for chronic pain related to his low back and perhaps right-sided sciatica. She suffers from frequent constipation due to that, and intermittent vomiting due to that. She has some vomiting yesterday which was nonbloody, and then this morning the emesis turned bloody. With the appearance of melena stools she had not been previously. No recent Pepto-Bismol. Status post cholecystectomy, appendectomy, colonoscopy, no history of EGD to her awareness. Pale compared to her normal complexion,   got dizzy and fell on the way to the bathroom overnight. The history is provided by the patient and a relative. No past medical history on file. Past Surgical History:   Procedure Laterality Date   [facility] SURGERY  2013   [facility] SURGERY  July, 2015    HX CHOLECYSTECTOMY      HX HYSTERECTOMY      HX OTHER SURGICAL  2011    left thoroscopy--[facility]         Family History:   Problem Relation Age of Onset    Stroke Father        Social History     Social History    Marital status:      Spouse name: N/A    Number of children: N/A    Years of education: N/A     Occupational History    Not on file. Social History Main Topics    Smoking status: Never Smoker    Smokeless tobacco: Never Used    Alcohol use No    Drug use: No    Sexual activity: Not on file     Other Topics Concern    Not on file     Social History Narrative     and lives with . She immigrated from [place] to [place] in 1974. She has lived in [place] since March, 2015. ALLERGIES: Codeine and Motrin [ibuprofen]    Review of Systems   Constitutional: Negative for chills and fever. HENT: Negative for rhinorrhea and sore throat. Eyes: Negative for discharge and redness. Respiratory: Negative for cough and shortness of breath.     Cardiovascular: Negative for chest pain and palpitations. Gastrointestinal: Positive for abdominal pain, blood in stool, nausea and vomiting. Negative for diarrhea. Musculoskeletal: Positive for back pain. Skin: Positive for pallor. Negative for rash. Neurological: Positive for dizziness and syncope. Negative for headaches. All other systems reviewed and are negative. Vitals:    06/25/17 1332 06/25/17 1447 06/25/17 1452   BP: 127/57 96/62 108/67   Pulse: 79     Resp: 20     SpO2: 97% 95% 96%   Weight: 54.4 kg (120 lb)     Height: 5' 1\" (1.549 m)              Physical Exam   Constitutional: She is oriented to person, place, and time. She appears well-developed and well-nourished. HENT:   Head: Normocephalic and atraumatic. Eyes: Conjunctivae are normal. Pupils are equal, round, and reactive to light. Right eye exhibits no discharge. Left eye exhibits no discharge. No scleral icterus. Neck: Normal range of motion. Neck supple. Cardiovascular: Normal rate, regular rhythm and normal heart sounds. Exam reveals no gallop. No murmur heard. Pulmonary/Chest: Effort normal and breath sounds normal. No respiratory distress. She has no wheezes. She has no rales. Abdominal: Soft. Bowel sounds are normal. There is no tenderness. There is no guarding. Musculoskeletal: Normal range of motion. She exhibits no edema. Neurological: She is alert and oriented to person, place, and time. She exhibits normal muscle tone. cni 2-12 grossly   Skin: Skin is warm and dry. She is not diaphoretic. Psychiatric: She has a normal mood and affect. Her behavior is normal.   Nursing note and vitals reviewed. MDM  Number of Diagnoses or Management Options  Anemia, unspecified type:   Gastrointestinal hemorrhage, unspecified gastrointestinal hemorrhage type:   Diagnosis management comments: Medical decision making note:  Upper GI bleed with melena, hypertension, pallor, passing out.   Jayleen-De León versus peptic ulcer disease, versus other.  Vital signs stable, fluids given, Protonix started. Admit to hospitalist service who will consult GI. This concludes the \"medical decision making note\" part of this emergency department visit note.       ED Course       Procedures

## 2017-06-26 ENCOUNTER — ANESTHESIA (OUTPATIENT)
Dept: ENDOSCOPY | Age: 77
DRG: 369 | End: 2017-06-26
Payer: MEDICARE

## 2017-06-26 ENCOUNTER — ANESTHESIA EVENT (OUTPATIENT)
Dept: ENDOSCOPY | Age: 77
DRG: 369 | End: 2017-06-26
Payer: MEDICARE

## 2017-06-26 LAB
ALBUMIN SERPL BCP-MCNC: 2.3 G/DL (ref 3.2–4.6)
ALBUMIN/GLOB SERPL: 0.9 {RATIO} (ref 1.2–3.5)
ALP SERPL-CCNC: 44 U/L (ref 50–136)
ALT SERPL-CCNC: 12 U/L (ref 12–65)
ANION GAP BLD CALC-SCNC: 9 MMOL/L (ref 7–16)
AST SERPL W P-5'-P-CCNC: 19 U/L (ref 15–37)
BILIRUB SERPL-MCNC: 1 MG/DL (ref 0.2–1.1)
BUN SERPL-MCNC: 25 MG/DL (ref 8–23)
CALCIUM SERPL-MCNC: 7.4 MG/DL (ref 8.3–10.4)
CHLORIDE SERPL-SCNC: 111 MMOL/L (ref 98–107)
CO2 SERPL-SCNC: 25 MMOL/L (ref 21–32)
CREAT SERPL-MCNC: 0.65 MG/DL (ref 0.6–1)
GLOBULIN SER CALC-MCNC: 2.7 G/DL (ref 2.3–3.5)
GLUCOSE SERPL-MCNC: 76 MG/DL (ref 65–100)
HCT VFR BLD AUTO: 20.9 % (ref 35.8–46.3)
HCT VFR BLD AUTO: 28.7 % (ref 35.8–46.3)
HCT VFR BLD AUTO: 29.8 % (ref 35.8–46.3)
HGB BLD-MCNC: 10.1 G/DL (ref 11.7–15.4)
HGB BLD-MCNC: 7.1 G/DL (ref 11.7–15.4)
HGB BLD-MCNC: 9.4 G/DL (ref 11.7–15.4)
POTASSIUM SERPL-SCNC: 4.5 MMOL/L (ref 3.5–5.1)
PROT SERPL-MCNC: 5 G/DL (ref 6.3–8.2)
SODIUM SERPL-SCNC: 145 MMOL/L (ref 136–145)

## 2017-06-26 PROCEDURE — 74011250636 HC RX REV CODE- 250/636: Performed by: EMERGENCY MEDICINE

## 2017-06-26 PROCEDURE — 36415 COLL VENOUS BLD VENIPUNCTURE: CPT | Performed by: INTERNAL MEDICINE

## 2017-06-26 PROCEDURE — 76040000025: Performed by: INTERNAL MEDICINE

## 2017-06-26 PROCEDURE — 0W3P8ZZ CONTROL BLEEDING IN GASTROINTESTINAL TRACT, VIA NATURAL OR ARTIFICIAL OPENING ENDOSCOPIC: ICD-10-PCS | Performed by: INTERNAL MEDICINE

## 2017-06-26 PROCEDURE — 74011250636 HC RX REV CODE- 250/636: Performed by: INTERNAL MEDICINE

## 2017-06-26 PROCEDURE — 77030014179 HC APPL CLP RESOL BSC -C: Performed by: INTERNAL MEDICINE

## 2017-06-26 PROCEDURE — P9016 RBC LEUKOCYTES REDUCED: HCPCS | Performed by: INTERNAL MEDICINE

## 2017-06-26 PROCEDURE — 74011250637 HC RX REV CODE- 250/637: Performed by: INTERNAL MEDICINE

## 2017-06-26 PROCEDURE — 74011250636 HC RX REV CODE- 250/636: Performed by: ANESTHESIOLOGY

## 2017-06-26 PROCEDURE — 65610000001 HC ROOM ICU GENERAL

## 2017-06-26 PROCEDURE — 77030013131 HC IV BLD ST ICUM -A

## 2017-06-26 PROCEDURE — 74011250636 HC RX REV CODE- 250/636

## 2017-06-26 PROCEDURE — 76060000032 HC ANESTHESIA 0.5 TO 1 HR: Performed by: INTERNAL MEDICINE

## 2017-06-26 PROCEDURE — 36430 TRANSFUSION BLD/BLD COMPNT: CPT

## 2017-06-26 PROCEDURE — 85018 HEMOGLOBIN: CPT | Performed by: INTERNAL MEDICINE

## 2017-06-26 PROCEDURE — 80053 COMPREHEN METABOLIC PANEL: CPT | Performed by: INTERNAL MEDICINE

## 2017-06-26 PROCEDURE — C9113 INJ PANTOPRAZOLE SODIUM, VIA: HCPCS | Performed by: EMERGENCY MEDICINE

## 2017-06-26 PROCEDURE — 74011000250 HC RX REV CODE- 250: Performed by: INTERNAL MEDICINE

## 2017-06-26 RX ORDER — HYDROMORPHONE HYDROCHLORIDE 1 MG/ML
1 INJECTION, SOLUTION INTRAMUSCULAR; INTRAVENOUS; SUBCUTANEOUS
Status: DISCONTINUED | OUTPATIENT
Start: 2017-06-26 | End: 2017-06-28

## 2017-06-26 RX ORDER — SODIUM CHLORIDE, SODIUM LACTATE, POTASSIUM CHLORIDE, CALCIUM CHLORIDE 600; 310; 30; 20 MG/100ML; MG/100ML; MG/100ML; MG/100ML
100 INJECTION, SOLUTION INTRAVENOUS CONTINUOUS
Status: DISCONTINUED | OUTPATIENT
Start: 2017-06-26 | End: 2017-06-26

## 2017-06-26 RX ORDER — PROPOFOL 10 MG/ML
INJECTION, EMULSION INTRAVENOUS
Status: DISCONTINUED | OUTPATIENT
Start: 2017-06-26 | End: 2017-06-26 | Stop reason: HOSPADM

## 2017-06-26 RX ORDER — PROPOFOL 10 MG/ML
INJECTION, EMULSION INTRAVENOUS AS NEEDED
Status: DISCONTINUED | OUTPATIENT
Start: 2017-06-26 | End: 2017-06-26 | Stop reason: HOSPADM

## 2017-06-26 RX ORDER — PROMETHAZINE HYDROCHLORIDE 25 MG/ML
INJECTION, SOLUTION INTRAMUSCULAR; INTRAVENOUS
Status: DISCONTINUED
Start: 2017-06-26 | End: 2017-06-26

## 2017-06-26 RX ORDER — SODIUM CHLORIDE 9 MG/ML
250 INJECTION, SOLUTION INTRAVENOUS AS NEEDED
Status: DISCONTINUED | OUTPATIENT
Start: 2017-06-26 | End: 2017-06-29 | Stop reason: HOSPADM

## 2017-06-26 RX ORDER — SODIUM CHLORIDE 0.9 % (FLUSH) 0.9 %
5-10 SYRINGE (ML) INJECTION AS NEEDED
Status: DISCONTINUED | OUTPATIENT
Start: 2017-06-26 | End: 2017-06-29 | Stop reason: HOSPADM

## 2017-06-26 RX ADMIN — Medication 10 ML: at 05:08

## 2017-06-26 RX ADMIN — SODIUM CHLORIDE 80 MG: 900 INJECTION, SOLUTION INTRAVENOUS at 13:05

## 2017-06-26 RX ADMIN — HYDROMORPHONE HYDROCHLORIDE 1 MG: 1 INJECTION, SOLUTION INTRAMUSCULAR; INTRAVENOUS; SUBCUTANEOUS at 14:18

## 2017-06-26 RX ADMIN — HYDROMORPHONE HYDROCHLORIDE 4 MG: 4 TABLET ORAL at 04:48

## 2017-06-26 RX ADMIN — SODIUM CHLORIDE 100 ML/HR: 900 INJECTION, SOLUTION INTRAVENOUS at 11:08

## 2017-06-26 RX ADMIN — SODIUM CHLORIDE 80 MG: 900 INJECTION, SOLUTION INTRAVENOUS at 00:59

## 2017-06-26 RX ADMIN — ONDANSETRON 4 MG: 2 INJECTION INTRAMUSCULAR; INTRAVENOUS at 07:33

## 2017-06-26 RX ADMIN — SODIUM CHLORIDE 80 MG: 900 INJECTION, SOLUTION INTRAVENOUS at 23:49

## 2017-06-26 RX ADMIN — PROMETHAZINE HYDROCHLORIDE 12.5 MG: 25 INJECTION INTRAMUSCULAR; INTRAVENOUS at 08:06

## 2017-06-26 RX ADMIN — HYDROMORPHONE HYDROCHLORIDE 1 MG: 1 INJECTION, SOLUTION INTRAMUSCULAR; INTRAVENOUS; SUBCUTANEOUS at 08:49

## 2017-06-26 RX ADMIN — SODIUM CHLORIDE, SODIUM LACTATE, POTASSIUM CHLORIDE, AND CALCIUM CHLORIDE 100 ML/HR: 600; 310; 30; 20 INJECTION, SOLUTION INTRAVENOUS at 10:24

## 2017-06-26 RX ADMIN — ATORVASTATIN CALCIUM 40 MG: 40 TABLET, FILM COATED ORAL at 22:00

## 2017-06-26 RX ADMIN — PROPOFOL 100 MCG/KG/MIN: 10 INJECTION, EMULSION INTRAVENOUS at 10:29

## 2017-06-26 RX ADMIN — Medication 10 ML: at 22:00

## 2017-06-26 RX ADMIN — PROPOFOL 100 MG: 10 INJECTION, EMULSION INTRAVENOUS at 10:29

## 2017-06-26 RX ADMIN — SODIUM CHLORIDE 200 ML/HR: 900 INJECTION, SOLUTION INTRAVENOUS at 00:10

## 2017-06-26 RX ADMIN — Medication 10 ML: at 15:55

## 2017-06-26 RX ADMIN — HYDROMORPHONE HYDROCHLORIDE 1 MG: 1 INJECTION, SOLUTION INTRAMUSCULAR; INTRAVENOUS; SUBCUTANEOUS at 23:54

## 2017-06-26 RX ADMIN — HYDROMORPHONE HYDROCHLORIDE 1 MG: 1 INJECTION, SOLUTION INTRAMUSCULAR; INTRAVENOUS; SUBCUTANEOUS at 18:02

## 2017-06-26 NOTE — ANESTHESIA PREPROCEDURE EVALUATION
Anesthetic History     PONV          Review of Systems / Medical History  Patient summary reviewed, nursing notes reviewed and pertinent labs reviewed    Pulmonary                Comments: Idiopathic pulmonary fibrosis - 3L NC qhs   Neuro/Psych   Within defined limits           Cardiovascular      Valvular problems/murmurs (Pt reports long history of murmur and normal echo )            Exercise tolerance: >4 METS  Comments: Echo 2016 - normal EF, mild MR, mild AI   GI/Hepatic/Renal     GERD          Comments: GI bleed Endo/Other      Hypothyroidism: well controlled  Anemia (Hgb 9 after 4 units PRBC)     Other Findings            Physical Exam    Airway  Mallampati: II  TM Distance: 4 - 6 cm  Neck ROM: normal range of motion   Mouth opening: Normal     Cardiovascular    Rhythm: regular  Rate: normal    Murmur: Grade 3     Dental      Comments: Multiple missing, but no loose   Pulmonary  Breath sounds clear to auscultation               Abdominal         Other Findings            Anesthetic Plan    ASA: 4  Anesthesia type: total IV anesthesia            Anesthetic plan and risks discussed with: Patient

## 2017-06-26 NOTE — PROGRESS NOTES
LEAPFROG PROTOCOL NOTE    Victory Dandy  6/26/2017    The patient is currently in the critical care setting managed by Dr. Audrey Marie with GI bleed. The patient's chart is reviewed and the patient is discussed with the staff. Patient is currently hemodynamically stable. Patient has no needs identified for Intensivist management in the critical care setting at this time. Please notify us if can be of assistance. No charge billed to the patient. Thank you.     ANDRÉS So

## 2017-06-26 NOTE — PROGRESS NOTES
Hospitalist Progress Note    Patient: Karen Thomason MRN: [id number]  SSN: [id number]    YOB: 1940  Age: 67 y.o. Sex: female      Admit Date: 6/25/2017    LOS: 1 day     Subjective:     From H&P: \"70 year old with Idiopathic Pulmonary Fibrosis (on chronic Prednisone & 3LNC @ night), hypothyroidism, chronic back pain (on chronic narcotics), & chronic constipation presented to the ER with one day of melena and 6-8 hours of hematemesis with associated near syncope and hypotension. The patient, and her son, who speaks good English, states that the patient has been \"fighting\" constipation due to narcotics for almost a year now. She has tried Linzess and other agents which did not help. She has been taking Dulcolax x 2-3 months and has had a hard time keeping it down with persistent vomiting.   She had been doing fine despite the vomiting until last evening when she started having melena. Then when she woke up this morning and was in her kitchen, she became really light headed and almost passed out then started vomiting bilious liquid that turned to bright red blood so she came to the ER. \"    6/25 - This AM the patient feels better. Still with some abdominal pain. Dilaudid helping chronic back pain. Denies N/V. Had multiple maroon stools overnight. Review of systems negative except stated above. Objective:     Vitals:    06/26/17 0331 06/26/17 0501 06/26/17 0532 06/26/17 0701   BP: 155/76 165/74 124/60 (!) 128/94   Pulse: 76 89 72 92   Resp: 20  18    Temp: 98.3 °F (36.8 °C)      SpO2: 100% 99% 100% 98%   Weight:       Height:            Intake and Output:  Current Shift: 06/26 0701 - 06/26 1900  In: -   Out: 400 [Urine:400]    Physical Exam:   GENERAL: alert, cooperative, no distress, appears stated age  EYE: conjunctivae/corneas clear. PERRL. THROAT & NECK: normal and no erythema or exudates noted.    LUNG: Diffuse crackles bilaterally  HEART: regular rate and rhythm, S1S2, no murmur, no JVD  ABDOMEN: soft, mildly tender, non-distended. Bowel sounds normal.   EXTREMITIES:  No edema, 2+ pedal/radial pulses bilaterally  SKIN: no rash or abnormalities  NEUROLOGIC: A&Ox3. Cranial nerves 2-12 grossly intact. Lab/Data Review:  BMP:   Lab Results   Component Value Date/Time     06/26/2017 04:00 AM    K 4.5 06/26/2017 04:00 AM     (H) 06/26/2017 04:00 AM    CO2 25 06/26/2017 04:00 AM    AGAP 9 06/26/2017 04:00 AM    GLU 76 06/26/2017 04:00 AM    BUN 25 (H) 06/26/2017 04:00 AM    CREA 0.65 06/26/2017 04:00 AM    GFRAA >60 06/26/2017 04:00 AM    GFRNA >60 06/26/2017 04:00 AM     CBC:   Lab Results   Component Value Date/Time    WBC 9.5 06/25/2017 01:45 PM    HGB 9.4 (L) 06/26/2017 04:00 AM    HCT 28.7 (L) 06/26/2017 04:00 AM     06/25/2017 01:45 PM     COAGS:   Lab Results   Component Value Date/Time    PTP 11.7 06/25/2017 05:40 PM    INR 1.1 06/25/2017 05:40 PM          Assessment:     Principal Problem:    GI bleed (6/25/2017)    Active Problems:    Hematemesis (6/25/2017)      Acute upper gastrointestinal bleeding (6/25/2017)      Melena (6/25/2017)      Hypotension (6/25/2017)      Acute prerenal azotemia (6/25/2017)        Plan:     - EGD today  - Trend H/H - s/p 3U PRBC  - Protonix gtt  - Decrease IVFS from 200 ml/hr to 100 ml/hr - BP stable  - Continue Prednisone despite GI risks  - Dilaudid PRN for pain  - May need better constipation control prior to discharge - Movantik?     Signed By: Yasmin Dunbar DO     June 26, 2017

## 2017-06-26 NOTE — PROGRESS NOTES
I have just spoken to Ricci Troy in the blood bank re: When I checked the allocated armband placed on the previous shift, the Green armband # H [id number] and the barcode label showed a transposition of numerals as H [id number]. I told the  this needs to be corrected on the pt ASAP. She is currently asymptomatic, warm, and dry, with BP= 120/56, MAP=81, and HR= 78, NSR. Her family has just arrived at the bedside.

## 2017-06-26 NOTE — INTERVAL H&P NOTE
H&P Update:  Ida Chapa was seen and examined. History and physical has been reviewed. The patient has been examined.  There have been no significant clinical changes since the completion of the originally dated History and Physical.    Signed By: Luis Hill MD     June 26, 2017 10:29 AM

## 2017-06-26 NOTE — ANESTHESIA POSTPROCEDURE EVALUATION
Post-Anesthesia Evaluation and Assessment    Patient: Griffin Collins MRN: [id number]  SSN: [id number]    YOB: 1940  Age: 67 y.o. Sex: female       Cardiovascular Function/Vital Signs  Visit Vitals    BP (!) 85/56    Pulse 96    Temp 37.4 °C (99.4 °F)    Resp 16    Ht 5' 1\" (1.549 m)    Wt 57.8 kg (127 lb 6.8 oz)    SpO2 100%    Breastfeeding No    BMI 24.08 kg/m2       Patient is status post total IV anesthesia anesthesia for Procedure(s):  ESOPHAGOGASTRODUODENOSCOPY (EGD)  BRAVO CLIP PLACEMENT. Nausea/Vomiting: None    Postoperative hydration reviewed and adequate. Pain:  Pain Scale 1: Numeric (0 - 10) (06/26/17 1017)  Pain Intensity 1: 8 (06/26/17 1017)   Managed    Neurological Status: At baseline    Mental Status and Level of Consciousness: Arousable    Pulmonary Status:   O2 Device: Nasal cannula (06/26/17 1110)   Adequate oxygenation and airway patent    Complications related to anesthesia: None    Post-anesthesia assessment completed.  No concerns    Signed By: Fatimah Crook MD     June 26, 2017

## 2017-06-26 NOTE — PROGRESS NOTES
2nd unit of PRBC's has just finished transfusing. 3rd unit ready to be picked up. No further stools. Lungs clear to auscultation. All 3 IV sites without signs of infiltration.

## 2017-06-26 NOTE — PROGRESS NOTES
Spoke with Tadeo in blood bank and she says it will be 45 more mins before blood is ready. I spoke with Diane Silvestre in lab re: H& H needed now since she will be getting transfused at 9:45 when it's due. I will place a call to  re: 600cc dark red liquid BM. 117/58. Son, Charissa Jones, wants to talk about his mom's addiction to opiates. I explained that this will need to be discussed with her physician and the pt is quite limited to what she can take for pain, due to active GI Bleed which contradicts NSAIDS.

## 2017-06-26 NOTE — PROCEDURES
DATE OF PROCEDURE: 6/26/17    REQUESTING PHYSICIAN: Dr Martin Bajwa    PROCEDURE: Esophagogastroduodenoscopy. ENDOSCOPIST: Paddy Galvan M.D. PREOPERATIVE DIAGNOSIS: Melena, Hematemesis, Blood loss anemia     POSTOPERATIVE DIAGNOSIS: Jayleen De León Tear with visible vessel     INSTRUMENTS: GIF H190    SEDATION: MAC    DESCRIPTION: After informed consent was obtained, the patient was taken to the endoscopy suite and placed in the left lateral decubitus position. Intravenous sedation was administered as above and posterior pharynx was anesthetized with local anesthetic spray. After adequate sedation had been achieved the endoscope was inserted over the tongue and through the posterior pharynx under direct visualization down the esophagus to the stomach and into the second portion of the duodenum. The endoscopic was withdrawn from that point, performing a careful survey of the mucosa. Retroflexion was performed in the gastric fundus. FINDINGS:  Esophagus: Normal appearing proximal mucosa with fresh blood in upper esophagus and oropharynx. There was a several cm sliding hiatal hernia noted. At the GE junction, there was a [employer] tear with a visible vessel (not actively bleeding). 2 hemoclips were successfully placed. Stomach: Normal appearing mucosa without ulcers. Fresh and old blood (small volume) noted. Duodenum: Normal mucosa with old residual blood.      Estimated blood loss:  0 cc-minimal    IMPRESSION:   [employer] tear with visible vessel    PLAN:  - Keep NPO for now  - PPI gtt for total of 72hrs  - No NGT or OGT  - Trend H/H and transfuse as needed  - Avoid nausea/retching with antiemetics  - Expect residual melena  - F/u with inpt rounding team    YE Urban MD

## 2017-06-26 NOTE — PROGRESS NOTES
TRANSFER - IN REPORT:    Verbal report received from [facility] RN(name) on Ann Mejia  being received from ICU(unit) for ordered procedure      Report consisted of patients Situation, Background, Assessment and   Recommendations(SBAR). Information from the following report(s) Kardex was reviewed with the receiving nurse. Opportunity for questions and clarification was provided. Assessment completed upon patients arrival to unit and care assumed.

## 2017-06-26 NOTE — PROGRESS NOTES
I have spoken with  re: delay and H& H results. Order for 2 more units after this one to achieve Hbg of >8 hopefully. Pt asymptomatic.

## 2017-06-26 NOTE — PROGRESS NOTES
Call placed to hospitalist pager @ [phone] re: asymptomatic lung crackles in posterior lung bases.

## 2017-06-26 NOTE — PROGRESS NOTES
Pt transferred back to room 3111 by me and CRNA. Report given to [facility].

## 2017-06-26 NOTE — INTERDISCIPLINARY ROUNDS
Interdisciplinary team rounds were held 6/26/2017 with the following team members:Care Management, Nursing, Nurse Practitioner, Nutrition, Palliative Care, Pastoral Care, Pharmacy, Physician, Respiratory Therapy and Clinical Coordinator and the patient. Plan of care discussed. See clinical pathway and/or care plan for interventions and desired outcomes.

## 2017-06-26 NOTE — PROGRESS NOTES
Rigoberto Ramey, phlebotomist, here at bedside now to recollect for type and cross and provide new armband. I have cut old armband off and given to my Alberto Greco RN.

## 2017-06-26 NOTE — PROGRESS NOTES
Patient back in room from procedure. Patient slightly hypotensive, responding to ivf's. Patient very drowsy, opens eyes.

## 2017-06-26 NOTE — PROGRESS NOTES
Spiritual Care visit. Attempted; patient was off the floor. Spiritual Care Assessment/Progress Notes  Assessment from [facility] Chart. Alia Smart [id number]  [id number]    1940  67 y.o.  female    Patient Telephone Number: [phone] (home)   Rastafarian Affiliation: Unknown   Language: English   Extended Emergency Contact Information  Primary Emergency Contact: Elvin Green  Address: [address] [place] [address] Road Phone: [phone]  Mobile Phone: [phone]  Relation: Spouse  Secondary Emergency Contact: Lesli Carrasco Phone: [phone]  Relation: Spouse   Patient Active Problem List    Diagnosis Date Noted    GI bleed 06/25/2017    Hematemesis 06/25/2017    Acute upper gastrointestinal bleeding 06/25/2017    Melena 06/25/2017    Hypotension 06/25/2017    Acute prerenal azotemia 06/25/2017    [facility] 08/04/2015    Amblyopia 07/28/2015    Ocular hypertension 07/28/2015    Primary open angle glaucoma 07/28/2015    Lumbar canal stenosis 07/23/2015    Pulmonary fibrosis [facility] 05/06/2015    Osteoporosis 05/06/2015    Hyperlipidemia 05/06/2015    GERD (gastroesophageal reflux disease) 05/06/2015        Date: 6/26/2017       Level of Rastafarian/Spiritual Activity:  []         Involved in jane tradition/spiritual practice    []         Not involved in jane tradition/spiritual practice  []         Spiritually oriented    []         Claims no spiritual orientation    []         seeking spiritual identity  []         Feels alienated from Tenriism practice/tradition  []         Feels angry about Tenriism practice/tradition  []         Spirituality/Tenriism tradition  a resource for coping at this time.   UNKNOWN  []         Not able to assess due to medical condition    Services Provided Today:  []         crisis intervention    []         reading Scriptures  [x] spiritual assessment    [x]         prayer  []         empathic listening/emotional support  []         rites and rituals (cite in comments)  []         life review     []         Yarsanism support  []         theological development   []         advocacy  []         ethical dialog     []         blessing  []         bereavement support    [x]         support to family  []         anticipatory grief support   []         help with AMD  []         spiritual guidance    []         meditation      Spiritual Care Needs  []         Emotional Support  []         Spiritual/Amish Care  []         Loss/Adjustment  []         Advocacy/Referral                /Ethics  []         No needs expressed at               this time  []         Other: (note in               comments)  [address]  []         Follow up visits with               pt/family  []         Provide materials  []         Schedule sacraments  []         Contact Community               Clergy  [x]         Follow up as needed  []         Other: (note in               comments)     Comments: Spiritual Assessment     Advance Directives  Legal Next of Kin remains as decision maker. Category/Code Status Full. Family Support  Yes. Spiritual Support  Unknown, per connect care chart.     Saira Frederick   Visit by Kareen Gunter M.Ed., Th.B. ,Staff

## 2017-06-26 NOTE — H&P (VIEW-ONLY)
[facility] Consult Note    Leslie Antony [place] 1940       Primary GI Physician: new    Referring Physician:  Dr Philomena Aviles Date:  6/25/2017    Admit Date:  6/25/2017    Chief Complaint:  Hematemesis and melena    Subjective:     History of Present Illness:  Patient is a 67 y.o. female seen in consultation at the request of Dr. Jose Coreas for evaluation of hematemesis and melena. She presented to the ER today with hematemesis which began today after the onset of melena yesterday. She notes a chronic hx of constipation for which she often takes large doses of Dulcolax; this is turn causes abdominal cramping and often, nausea and vomiting. She noted loose black stools yesterday, despite no dose of Dulcolax, and today began to vomit with red blood noted with initial emesis. She reports chronic pain and denies use of nsaids for this but does note daily use of steroids for pulmonary fibrosis. Her breathing does not seem worsened after the emesis but she does report weakness and chills. She was evaluated by the hospitalist in the ER with hgb 7.9 on arrival.  She has had no emesis or melena since arrival.  She is to receive transfusion of PRBC and is npo. She has been placed on a Protonix gtt. She denies prior hx of gi bleed. PMH:  Chronic back pain  Pulmonary fibrosis  Heart murmur  Glaucoma  Hyperlipidemia  GERD  Thyroid disease  Chronic constipation    PSH:  Past Surgical History:   Procedure Laterality Date   Wallie Santo SURGERY  2013    HX BACK SURGERY  July, 2015    HX CHOLECYSTECTOMY      HX HYSTERECTOMY      [facility] OTHER SURGICAL  2011    left thoroscopy--UIP       Allergies: Allergies   Allergen Reactions    Codeine Nausea Only    Motrin [Ibuprofen] Nausea Only       Home Medications:  Prior to Admission medications    Medication Sig Start Date End Date Taking?  Authorizing Provider   HYDROmorphone (DILAUDID) 4 mg tablet Take 4 mg by mouth every three (3) hours as needed for Pain. Yes Sugey Gallegos MD   predniSONE (DELTASONE) 5 mg tablet Take 1 Tab by mouth daily (with breakfast). Starting Feb 14, 5mg daily x 1 week, Starting Feb 21, 5mg alternating with 2.5mg daily x 1 week; Starting Feb 28, 5mg every other day until seen back in the office. 2/15/16  Yes Sammie Gonzalez MD   pantoprazole (PROTONIX) 40 mg tablet Take 40 mg by mouth daily. Yes Historical Provider   levothyroxine (SYNTHROID) 75 mcg tablet Take 50 mcg by mouth Daily (before breakfast). Yes Historical Provider   atorvastatin (LIPITOR) 10 mg tablet Take  by mouth daily. Yes Historical Provider   bisacodyl (DULCOLAX, BISACODYL,) 5 mg EC tablet Take 5 mg by mouth daily as needed for Constipation. Yes Historical Provider   predniSONE (DELTASONE) 10 mg tablet Take 2 tablet daily for one week, then 1-1/2 tablet daily until she is seen 10/28/15   Fabián Dove MD       [facility] Medications:  Current Facility-Administered Medications   Medication Dose Route Frequency    pantoprazole (PROTONIX) 80 mg in 0.9% sodium chloride 250 mL infusion  80 mg IntraVENous CONTINUOUS     Current Outpatient Prescriptions   Medication Sig    HYDROmorphone (DILAUDID) 4 mg tablet Take 4 mg by mouth every three (3) hours as needed for Pain.  predniSONE (DELTASONE) 5 mg tablet Take 1 Tab by mouth daily (with breakfast). Starting Feb 14, 5mg daily x 1 week, Starting Feb 21, 5mg alternating with 2.5mg daily x 1 week; Starting Feb 28, 5mg every other day until seen back in the office.  pantoprazole (PROTONIX) 40 mg tablet Take 40 mg by mouth daily.  levothyroxine (SYNTHROID) 75 mcg tablet Take 50 mcg by mouth Daily (before breakfast).  atorvastatin (LIPITOR) 10 mg tablet Take  by mouth daily.  bisacodyl (DULCOLAX, BISACODYL,) 5 mg EC tablet Take 5 mg by mouth daily as needed for Constipation.     predniSONE (DELTASONE) 10 mg tablet Take 2 tablet daily for one week, then 1-1/2 tablet daily until she is seen       Social History:  Social History   Substance Use Topics    Smoking status: Never Smoker    Smokeless tobacco: Never Used    Alcohol use No           Family History:  Family History   Problem Relation Age of Onset    Stroke Father        Review of Systems:  A detailed 10 system ROS is obtained, with pertinent positives as listed above. All others are negative. Diet:  npo    Objective:     Physical Exam:  Vitals:  Visit Vitals    /50    Pulse 73    Temp 97.9 °F (36.6 °C)    Resp 16    Ht 5' 1\" (1.549 m)    Wt 54.4 kg (120 lb)    SpO2 94%    BMI 22.67 kg/m2     Gen:  Pt is alert, cooperative, no acute distress  Skin:  Extremities and face reveal no rashes. Pale, sallow appearing. HEENT: Sclerae anicteric. No abnormal pigmentation of the lips. The neck is supple. Cardiovascular: Regular rate and rhythm. Audible murmurs. Respiratory:  Comfortable breathing with no accessory muscle use. Decreased breath sounds anteriorly with no wheezes, rales, or rhonchi. GI:  Abdomen nondistended, soft, and nontender. Normal active bowel sounds. No enlargement of the liver or spleen. No masses palpable. Rectal:  Deferred  Musculoskeletal:  No pitting edema of the lower legs. Neurological:  Gross memory appears intact. Patient is alert and oriented. Psychiatric:  Mood appears appropriate with judgement intact. Lymphatic:  No cervical or supraclavicular adenopathy.     Laboratory:    Recent Labs      06/25/17   1345   WBC  9.5   HGB  7.9*   HCT  24.6*   PLT  279   MCV  85.7   NA  142   K  4.2   CL  107   CO2  29   BUN  30*   CREA  0.76   CA  7.9*   GLU  98   AP  59   SGOT  16   ALT  15   TBILI  0.4   ALB  2.8*   TP  6.5   LPSE  133          Assessment:     Principal Problem:    GI bleed (6/25/2017)    Active Problems:    Hematemesis (6/25/2017)      Acute upper gastrointestinal bleeding (6/25/2017)      Melena (6/25/2017)      Hypotension (6/25/2017)        Plan:     68 yo female is seen in consultation for evaluation of a one day hx of melena, followed by onset of hematemesis today. She presented to the ER with weakness and was noted to have hgb 7.9. She denies hx of gib in the past and denies use of nsaids but does report daily steroid use of hx of pulmonary fibrosis, followed by a pulmonologist at [facility]. She has been typed and crossed for PRBC transfusion and Protonix gtt has been started. Etiology of bleeding may be [place] tear but mucosal ulceration secondary to steroids is more likely. 1.  Agree with admission to the ICU, given hypotension with bleeding  2. Serial hgb, transfuse as needed. Will keep PRBC on hold in blood bank. 3.  Continue Protonix gtt  4. Will need EGD in evaluation; will discuss timing with Dr Charles Ortiz     Patient is seen and examined in collaboration with Dr. Brenda Holm. Assessment and plan as per Dr. Charles Ortiz.   Doss Runner NP

## 2017-06-26 NOTE — PROGRESS NOTES
Call placed to  [facility] at [phone]. Blood should be ready to be picked up in 10-15 mins. Tadeo in blood bank to notify me.

## 2017-06-26 NOTE — PROGRESS NOTES
TRANSFER - IN REPORT:    Verbal report received from Helen Riggins RN(name) on Micheline Mcdaniel  being received from GI lab(unit) for routine post - op      Report consisted of patients Situation, Background, Assessment and   Recommendations(SBAR). Information from the following report(s) SBAR, Procedure Summary, Intake/Output, MAR and Med Rec Status was reviewed with the receiving nurse. Opportunity for questions and clarification was provided. Assessment completed upon patients arrival to unit and care assumed.

## 2017-06-27 LAB
ABO + RH BLD: NORMAL
ABO + RH BLD: NORMAL
ALBUMIN SERPL BCP-MCNC: 2.3 G/DL (ref 3.2–4.6)
ALBUMIN/GLOB SERPL: 0.9 {RATIO} (ref 1.2–3.5)
ALP SERPL-CCNC: 41 U/L (ref 50–136)
ALT SERPL-CCNC: 11 U/L (ref 12–65)
ANION GAP BLD CALC-SCNC: 8 MMOL/L (ref 7–16)
AST SERPL W P-5'-P-CCNC: 16 U/L (ref 15–37)
BILIRUB SERPL-MCNC: 1.4 MG/DL (ref 0.2–1.1)
BLD PROD TYP BPU: NORMAL
BLOOD GROUP ANTIBODIES SERPL: NORMAL
BLOOD GROUP ANTIBODIES SERPL: NORMAL
BPU ID: NORMAL
BUN SERPL-MCNC: 16 MG/DL (ref 8–23)
CALCIUM SERPL-MCNC: 7.8 MG/DL (ref 8.3–10.4)
CHLORIDE SERPL-SCNC: 108 MMOL/L (ref 98–107)
CO2 SERPL-SCNC: 27 MMOL/L (ref 21–32)
CREAT SERPL-MCNC: 0.7 MG/DL (ref 0.6–1)
CROSSMATCH RESULT,%XM: NORMAL
GLOBULIN SER CALC-MCNC: 2.7 G/DL (ref 2.3–3.5)
GLUCOSE SERPL-MCNC: 71 MG/DL (ref 65–100)
HCT VFR BLD AUTO: 28 % (ref 35.8–46.3)
HCT VFR BLD AUTO: 28.5 % (ref 35.8–46.3)
HGB BLD-MCNC: 9.6 G/DL (ref 11.7–15.4)
HGB BLD-MCNC: 9.7 G/DL (ref 11.7–15.4)
POTASSIUM SERPL-SCNC: 3.5 MMOL/L (ref 3.5–5.1)
PROT SERPL-MCNC: 5 G/DL (ref 6.3–8.2)
SODIUM SERPL-SCNC: 143 MMOL/L (ref 136–145)
SPECIMEN EXP DATE BLD: NORMAL
SPECIMEN EXP DATE BLD: NORMAL
STATUS OF UNIT,%ST: NORMAL
UNIT DIVISION, %UDIV: 0

## 2017-06-27 PROCEDURE — 74011250636 HC RX REV CODE- 250/636: Performed by: EMERGENCY MEDICINE

## 2017-06-27 PROCEDURE — 97166 OT EVAL MOD COMPLEX 45 MIN: CPT

## 2017-06-27 PROCEDURE — 65610000001 HC ROOM ICU GENERAL

## 2017-06-27 PROCEDURE — 74011250636 HC RX REV CODE- 250/636: Performed by: INTERNAL MEDICINE

## 2017-06-27 PROCEDURE — 74011636637 HC RX REV CODE- 636/637: Performed by: INTERNAL MEDICINE

## 2017-06-27 PROCEDURE — 85018 HEMOGLOBIN: CPT | Performed by: INTERNAL MEDICINE

## 2017-06-27 PROCEDURE — 36415 COLL VENOUS BLD VENIPUNCTURE: CPT | Performed by: INTERNAL MEDICINE

## 2017-06-27 PROCEDURE — 74011250637 HC RX REV CODE- 250/637: Performed by: INTERNAL MEDICINE

## 2017-06-27 PROCEDURE — 97161 PT EVAL LOW COMPLEX 20 MIN: CPT

## 2017-06-27 PROCEDURE — C9113 INJ PANTOPRAZOLE SODIUM, VIA: HCPCS | Performed by: EMERGENCY MEDICINE

## 2017-06-27 PROCEDURE — 65270000029 HC RM PRIVATE

## 2017-06-27 PROCEDURE — 80053 COMPREHEN METABOLIC PANEL: CPT | Performed by: INTERNAL MEDICINE

## 2017-06-27 RX ORDER — AMOXICILLIN 250 MG
1 CAPSULE ORAL DAILY
Status: DISCONTINUED | OUTPATIENT
Start: 2017-06-28 | End: 2017-06-29 | Stop reason: HOSPADM

## 2017-06-27 RX ADMIN — HYDROMORPHONE HYDROCHLORIDE 1 MG: 1 INJECTION, SOLUTION INTRAMUSCULAR; INTRAVENOUS; SUBCUTANEOUS at 06:04

## 2017-06-27 RX ADMIN — HYDROMORPHONE HYDROCHLORIDE 1 MG: 1 INJECTION, SOLUTION INTRAMUSCULAR; INTRAVENOUS; SUBCUTANEOUS at 23:14

## 2017-06-27 RX ADMIN — PREDNISONE 5 MG: 5 TABLET ORAL at 07:14

## 2017-06-27 RX ADMIN — Medication 10 ML: at 21:49

## 2017-06-27 RX ADMIN — ATORVASTATIN CALCIUM 40 MG: 40 TABLET, FILM COATED ORAL at 21:46

## 2017-06-27 RX ADMIN — LEVOTHYROXINE SODIUM 50 MCG: 50 TABLET ORAL at 07:14

## 2017-06-27 RX ADMIN — Medication 10 ML: at 06:00

## 2017-06-27 RX ADMIN — HYDROMORPHONE HYDROCHLORIDE 1 MG: 1 INJECTION, SOLUTION INTRAMUSCULAR; INTRAVENOUS; SUBCUTANEOUS at 11:04

## 2017-06-27 RX ADMIN — SODIUM CHLORIDE 80 MG: 900 INJECTION, SOLUTION INTRAVENOUS at 10:30

## 2017-06-27 RX ADMIN — SODIUM CHLORIDE 100 ML/HR: 900 INJECTION, SOLUTION INTRAVENOUS at 11:01

## 2017-06-27 RX ADMIN — HYDROMORPHONE HYDROCHLORIDE 1 MG: 1 INJECTION, SOLUTION INTRAMUSCULAR; INTRAVENOUS; SUBCUTANEOUS at 17:40

## 2017-06-27 NOTE — PROGRESS NOTES
Patient is alert and oriented, opens her eyes spontaneously and follows all commands. NC, 2L/min. Speech is clear. NSR, BP stable. Bilateral breath sounds are coarse and diminished in the lower bases. Patient's abdomen is semi-soft and tender with active bowel sounds x4. Patient is voiding clear, yellow urine. No muscle weakness noted. Pulses are palpable to all extremities. Cap refill less than 3 seconds to all extremities. No edema noted. Skin is dry, pale and fragile. No skin issues noted. VSS, NAD.

## 2017-06-27 NOTE — PROGRESS NOTES
GI DAILY PROGRESS NOTE    Admit Date:  6/25/2017    Today's Date:  6/27/2017    CC:  Melena, hematemesis     Subjective:     Patient c/o back pain but denies N/V or epigastric/abd pain. Small dark stool last PM, none today. Medications:   Current Facility-Administered Medications   Medication Dose Route Frequency    HYDROmorphone (PF) (DILAUDID) injection 1 mg  1 mg IntraVENous Q3H PRN    promethazine (PHENERGAN) with saline injection 12.5 mg  12.5 mg IntraVENous Q4H PRN    sodium chloride (NS) flush 5-10 mL  5-10 mL IntraVENous PRN    famotidine (PF) (PEPCID) 20 mg in sodium chloride 0.9 % 10 mL injection  20 mg IntraVENous ONCE PRN    0.9% sodium chloride infusion 250 mL  250 mL IntraVENous PRN    pantoprazole (PROTONIX) 80 mg in 0.9% sodium chloride 250 mL infusion  80 mg IntraVENous CONTINUOUS    atorvastatin (LIPITOR) tablet 40 mg  40 mg Oral DAILY    predniSONE (DELTASONE) tablet 5 mg  5 mg Oral DAILY WITH BREAKFAST    levothyroxine (SYNTHROID) tablet 50 mcg  50 mcg Oral ACB    sodium chloride (NS) flush 5-10 mL  5-10 mL IntraVENous Q8H    sodium chloride (NS) flush 5-10 mL  5-10 mL IntraVENous PRN    0.9% sodium chloride infusion  100 mL/hr IntraVENous CONTINUOUS    ondansetron (ZOFRAN) injection 4 mg  4 mg IntraVENous Q4H PRN    0.9% sodium chloride infusion 250 mL  250 mL IntraVENous PRN       Review of Systems:  ROS was obtained, with pertinent positives as listed above. No chest pain or SOB.     Diet:  NPO    Objective:   Vitals:  Visit Vitals    /71    Pulse 74    Temp 98.4 °F (36.9 °C)    Resp 15    Ht 5' 1\" (1.549 m)    Wt 57.8 kg (127 lb 6.8 oz)    SpO2 100%    Breastfeeding No    BMI 24.08 kg/m2     Intake/Output:  06/27 0701 - 06/27 1900  In: -   Out: 450 [Urine:450]  06/25 1901 - 06/27 0700  In: 7861.9 [I.V.:6174.2]  Out: [address] [Urine:4550]  Exam:  General appearance: alert, cooperative, no distress  Lungs: clear to auscultation bilaterally anteriorly  Heart: regular rate and rhythm  Abdomen: soft, non-tender. Bowel sounds normal. No masses, no organomegaly  Extremities: extremities normal, atraumatic, no cyanosis or edema  Neuro:  alert and oriented    Data Review (Labs):    Recent Labs      06/27/17   0350  06/26/17   2140  06/26/17   1343  06/26/17   0400  06/25/17   2037  06/25/17   1740  06/25/17   1345   WBC   --    --    --    --    --    --   9.5   HGB  9.7*  10.1*  7.1*  9.4*  5.9*   --   7.9*   HCT  28.5*  29.8*  20.9*  28.7*  18.3*   --   24.6*   PLT   --    --    --    --    --    --   279   MCV   --    --    --    --    --    --   85.7   NA  143   --    --   145   --   144  142   K  3.5   --    --   4.5   --   4.5  4.2   CL  108*   --    --   111*   --   111*  107   CO2  27   --    --   25   --   25  29   BUN  16   --    --   25*   --   27*  30*   CREA  0.70   --    --   0.65   --   0.66  0.76   CA  7.8*   --    --   7.4*   --   7.4*  7.9*   GLU  71   --    --   76   --   93  98   AP  41*   --    --   44*   --   47*  59   SGOT  16   --    --   19   --   19  16   ALT  11*   --    --   12   --   13  15   TBILI  1.4*   --    --   1.0   --   0.4  0.4   ALB  2.3*   --    --   2.3*   --   2.5*  2.8*   TP  5.0*   --    --   5.0*   --   5.3*  6.5   LPSE   --    --    --    --    --    --   133   PTP   --    --    --    --    --   11.7   --    INR   --    --    --    --    --   1.1   --      EGD 6/26/17 Dr ANDRÉS Bradley  Esophagus: Normal appearing proximal mucosa with fresh blood in upper esophagus and oropharynx. There was a several cm sliding hiatal hernia noted. At the GE junction, there was a [employer] tear with a visible vessel (not actively bleeding). 2 hemoclips were successfully placed. Stomach: Normal appearing mucosa without ulcers. Fresh and old blood (small volume) noted. Duodenum: Normal mucosa with old residual blood.    Estimated blood loss:  0 cc-minimal  IMPRESSION:   [employer] tear with visible vessel  PLAN:  - Keep NPO for now  - PPI gtt for total of 72hrs  - No NGT or OGT  - Trend H/H and transfuse as needed  - Avoid nausea/retching with antiemetics  - Expect residual melena  - F/u with inpt rounding team    Assessment:     Principal Problem:    GI bleed (6/25/2017)    Active Problems:    Hematemesis (6/25/2017)      Acute upper gastrointestinal bleeding (6/25/2017)      Melena (6/25/2017)      Hypotension (6/25/2017)      Acute prerenal azotemia (6/25/2017)      68 yo female is seen in consultation for evaluation of of melena, followed by onset of hematemesis. She presented to the ER with weakness and was noted to have hgb 7.9. She denies hx of gib in the past and denies use of nsaids but does report daily steroid use of hx of pulmonary fibrosis, followed by a pulmonologist at [facility]. EGD yesterday w MW tear with visible vessel s/p endo clip x2. No nausea today  Plan:     1) Continue PPI gtt for 72 hours  2) Hgb stable, monitor   3) Begin clear liquid diet  4) Antiemetics if needed    Bassam Wynne NP  Patient is seen and examined in collaboration with Dr. Iliana Winslow. Assessment and plan as per Dr. Sukumar Velasco.

## 2017-06-27 NOTE — PROGRESS NOTES
Physical Therapy Note:    Participated in interdisciplinary rounds in ICU and chart reviewed. Patient is experiencing decrease in function from baseline. Patient would benefit from skilled acute therapy to increase independence with self care/ADLs, strength, endurance, and functional mobility. Recommend PT/OT consult when medically stable and MD agrees.     Thank you for your consideration,  Camila Gruber, PT, DPT

## 2017-06-27 NOTE — PROGRESS NOTES
Hospitalist Progress Note    2017  Admit Date: 2017  1:48 PM   NAME: Griffin Collins   :  1940   MRN:  [id number]   Attending: Hi Rudolph DO  PCP:  Rigoberto Parmar NP    SUBJECTIVE:   67year old with Idiopathic Pulmonary Fibrosis (on chronic Prednisone & 3LNC @ night), hypothyroidism, chronic back pain (on chronic narcotics), & chronic constipation presented to the ER with one day of melena and 6-8 hours of hematemesis with associated near syncope and hypotension. Hgb was 5.9 s/p 3u prbcs and EGD showed MWT with non bleeding Vessel s/p Clipping. GI managing. : Feels better, no ABd pain, CP or SOB. BP stable. Nursing notes and chart reviewed. Review of Systems negative with exception of pertinent positives noted above. PHYSICAL EXAM     Visit Vitals    /71    Pulse 74    Temp 98.4 °F (36.9 °C)    Resp 15    Ht 5' 1\" (1.549 m)    Wt 57.8 kg (127 lb 6.8 oz)    SpO2 100%    Breastfeeding No    BMI 24.08 kg/m2      Temp (24hrs), Av.8 °F (37.1 °C), Min:98.3 °F (36.8 °C), Max:99.6 °F (37.6 °C)    Oxygen Therapy  O2 Sat (%): 100 % (17 1030)  Pulse via Oximetry: 73 beats per minute (17 1030)  O2 Device: Nasal cannula (17 0701)  O2 Flow Rate (L/min): 2 l/min (17 0701)    Intake/Output Summary (Last 24 hours) at 17 1104  Last data filed at 17 1032   Gross per 24 hour   Intake          3658.79 ml   Output             2800 ml   Net           858.79 ml       General: No acute distress. Alert.    Lungs:  CTABL. Heart:  RRR, no murmur, rub, or gallop  Abdomen: Soft, non-distended, non-tender, +bs  Extremities: No cyanosis or clubbing. Neurologic:  No focal deficits. Moves all extremities. Skin:  No rash noted  Psych:  Normal Affect    LABS AND STUDIES:  Personally reviewed all labs, meds, and studies for past 24hrs.       ASSESSMENT      Active Hospital Problems    Diagnosis Date Noted    GI bleed 2017    Hematemesis 06/25/2017    Acute upper gastrointestinal bleeding 06/25/2017    Melena 06/25/2017    Hypotension 06/25/2017    Acute prerenal azotemia 06/25/2017           PLAN:  · Acute Blood Loss Anemia 2/2 Upper GIBleed: s/p 3u, monitor H&H  · GI Bleed due to [employer] Tear: s/p EGD clipping, on protonix gtt for 72 hrs and NPO per GI, avoid Nausea/retching  · IPF on chronic steroids and O2: on prednisone and Oxygen  · Chronic Back pain and Constipation: Monitor, will start stool softeners    Dispo: transfer to floor    DVT ppx:  scds  Discussed plan with pt who is in agreement. All questions answered.     Signed By: Kwabena Garza MD     June 27, 2017

## 2017-06-27 NOTE — PROGRESS NOTES
Spiritual Care visit. Follow up visit. Visited with patient at bedside. No prayer.     Visit by Daniel Duarte M.Ed., Th.B. ,Staff

## 2017-06-27 NOTE — PROGRESS NOTES
Problem: Mobility Impaired (Adult and Pediatric)  Goal: *Acute Goals and Plan of Care (Insert Text)  1. Ms. Monique Powell will perform supine to sit and sit to supine independently in 3 days. 2. Ms. Monique Powell will perform sit to stand and bed to chair independently in 3 days. 3. Ms. Monique Powell will perform gait with appropriate assistive device 250 ft independently in 3 days. PHYSICAL THERAPY: INITIAL ASSESSMENT 6/27/2017  INPATIENT: Hospital Day: 3  [place] / Product Type: Medicare /      NAME/AGE/GENDER: Antony Arrieta is a 67 y.o. female    PRIMARY DIAGNOSIS: Melena [K92.1] GI bleed GI bleed  Procedure(s) (LRB):  ESOPHAGOGASTRODUODENOSCOPY (EGD) (N/A)   CLIP PLACEMENT (N/A)  1 Day Post-Op  ICD-10: Treatment Diagnosis:       · Generalized Muscle Weakness (M62.81)  · Difficulty in walking, Not elsewhere classified (R26.2)   Precaution/Allergies:  Codeine and Motrin [ibuprofen]       ASSESSMENT:      Ms. Monique Powell presents with decreased mobility and decreased gait. She is  S/p GI bleed. At baseline she admits that she has constant back pain and admits that she is dependent on pain medicine. She has had several injections and surgery with no help. 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 It is numb and weak. She uses a cane. She is a delightful woman who came over from [facility] 38 years ago. She moved pretty well today. She was able to get out of bed without assist.  She was able to ambulate using me as her cane on the right side to ambulate. Ms. Mae Simmons is likely close to baseline but would benefit from PT while here in hospital to make sure she is at her prior level of function. Do not anticipate any needs at discharge. This section established at most recent assessment   PROBLEM LIST (Impairments causing functional limitations):  1. Decreased Strength  2. Decreased Transfer Abilities  3.  Decreased Ambulation Ability/Technique  4. Increased Pain  5. Decreased Activity Tolerance    INTERVENTIONS PLANNED: (Benefits and precautions of physical therapy have been discussed with the patient.)  1. Bed Mobility  2. Gait Training  3. Therapeutic Activites  4. Transfer Training  5. Group Therapy      TREATMENT PLAN: Frequency/Duration: 3 times a week for duration of hospital stay  Rehabilitation Potential For Stated Goals: GOOD      RECOMMENDED REHABILITATION/EQUIPMENT: (at time of discharge pending progress): Continue Skilled Therapy. HISTORY:   History of Present Injury/Illness (Reason for Referral):  Patient is a 67 y.o. female seen in consultation at the request of Dr. Nicolette Kwan for evaluation of hematemesis and melena. She presented to the ER today with hematemesis which began today after the onset of melena yesterday. She notes a chronic hx of constipation for which she often takes large doses of Dulcolax; this is turn causes abdominal cramping and often, nausea and vomiting. She noted loose black stools yesterday, despite no dose of Dulcolax, and today began to vomit with red blood noted with initial emesis. She reports chronic pain and denies use of nsaids for this but does note daily use of steroids for pulmonary fibrosis. Her breathing does not seem worsened after the emesis but she does report weakness and chills. She was evaluated by the hospitalist in the ER with hgb 7.9 on arrival.  She has had no emesis or melena since arrival.  She is to receive transfusion of PRBC and is npo. She has been placed on a Protonix gtt. She denies prior hx of gi bleed. Past Medical History/Comorbidities:   Ms. Alphonso Noriega  has no past medical history on file. Ms. Alphonso Noriega  has a past surgical history that includes back surgery (2013); other surgical (2011); hysterectomy; cholecystectomy; and back surgery (July, 2015).   Social History/Living Environment:   Home Environment: Private residence  One/Two Story Residence: One story  Living Alone: No  Support Systems: Spouse/Significant Other/Partner  Patient Expects to be Discharged to[de-identified] Private residence  Current DME Used/Available at Home: Oxygen, portable, Cane, straight, Walker, rolling  Prior Level of Function/Work/Activity:  Used a cane. Otherwise independent. chronic pain, GI bleed   Number of Personal Factors/Comorbidities that affect the Plan of Care: 1-2: MODERATE COMPLEXITY   EXAMINATION:   Most Recent Physical Functioning:   Gross Assessment:  AROM: Generally decreased, functional  Strength: Generally decreased, functional               Posture:  Posture (WDL): Within defined limits  Balance:  Sitting: Intact  Standing: Impaired  Standing - Static: Fair  Standing - Dynamic : Fair Bed Mobility:  Rolling: Supervision  Supine to Sit: Supervision  Wheelchair Mobility:     Transfers:  Sit to Stand: Supervision  Stand to Sit: Supervision  Gait:     Base of Support: Narrowed  Speed/Marian: Slow  Step Length: Right shortened;Left shortened  Distance (ft): 60 Feet (ft)  Assistive Device: Other (comment) (hand held assist.)  Ambulation - Level of Assistance: Contact guard assistance  Interventions: Manual cues; Safety awareness training; Tactile cues; Verbal cues       Body Structures Involved:  1. Muscles Body Functions Affected:  1. Movement Related Activities and Participation Affected:  1. Mobility  2. Domestic Life   Number of elements that affect the Plan of Care: 1-2: LOW COMPLEXITY   CLINICAL PRESENTATION:   Presentation: Stable and uncomplicated: LOW COMPLEXITY   CLINICAL DECISION MAKIN [facility] [address] [id number] AM-PAC 6 Clicks   Basic Mobility Inpatient Short Form  How much difficulty does the patient currently have. .. Unable A Lot A Little None   1. Turning over in bed (including adjusting bedclothes, sheets and blankets)? [ ] 1   [ ] 2   [ ] 3   [X] 4   2.   Sitting down on and standing up from a chair with arms ( e.g., wheelchair, bedside commode, etc.)   [ ] 1   [ ] 2   [ ] 3   [X] 4   3. Moving from lying on back to sitting on the side of the bed? [ ] 1   [ ] 2   [ ] 3   [X] 4   How much help from another person does the patient currently need. .. Total A Lot A Little None   4. Moving to and from a bed to a chair (including a wheelchair)? [ ] 1   [ ] 2   [X] 3   [ ] 4   5. Need to walk in hospital room? [ ] 1   [ ] 2   [X] 3   [ ] 4   6. Climbing 3-5 steps with a railing? [ ] 1   [ ] 2   [X] 3   [ ] 4   © 2007, Trustees of [address] [place] [address] [address] [address], under license to [employer]. All rights reserved    Score:  Initial: 21 Most Recent: X (Date: -- )     Interpretation of Tool:  Represents activities that are increasingly more difficult (i.e. Bed mobility, Transfers, Gait). Score 24 23 22-20 19-15 14-10 9-7 6       Modifier CH CI CJ CK CL CM CN         · Mobility - Walking and Moving Around:               - CURRENT STATUS:    CJ - 20%-39% impaired, limited or restricted               - GOAL STATUS:           CJ - 20%-39% impaired, limited or restricted               - D/C STATUS:                       ---------------To be determined---------------  [place] / Product Type: Medicare /       Medical Necessity:     · Patient is expected to demonstrate progress in functional technique to increase independence with mobility and gait. .  Reason for Services/Other Comments:  · Patient continues to require present interventions due to patient's inability to function at baseline. .   Use of outcome tool(s) and clinical judgement create a POC that gives a: Clear prediction of patient's progress: LOW COMPLEXITY                 TREATMENT:   (In addition to Assessment/Re-Assessment sessions the following treatments were rendered)   Pre-treatment Symptoms/Complaints:  8  Pain: Initial:   Pain Intensity 1: 8  Pain Location 1: Back  Pain Intervention(s) 1: Emotional support, Exercise  Post Session:  8 (this is the way she always is.)      Assessment/Reassessment only, no treatment provided today     Braces/Orthotics/Lines/Etc:   · O2 Device: Nasal cannula  Treatment/Session Assessment:    · Response to Treatment:  good  · Interdisciplinary Collaboration:  · Registered Nurse  · After treatment position/precautions:  · Up in chair  · Call light within reach  · RN notified  · Compliance with Program/Exercises: Will assess as treatment progresses. · Recommendations/Intent for next treatment session: \"Next visit will focus on advancements to more challenging activities and reduction in assistance provided\".   Total Treatment Duration:  PT Patient Time In/Time Out  Time In: 1215  Time Out: Jose Juan Stout PT

## 2017-06-27 NOTE — PROGRESS NOTES
Bedside shift change report given to Neftali (oncoming nurse) by Vijay Stephens RN (offgoing nurse). Report included the following information SBAR, Kardex, ED Summary, Procedure Summary, Intake/Output, MAR, Recent Results and Cardiac Rhythm NSR.

## 2017-06-27 NOTE — PROGRESS NOTES
Problem: Self Care Deficits Care Plan (Adult)  Goal: *Acute Goals and Plan of Care (Insert Text)  1. Patient will complete full body bathing and dressing with supervision and adaptive equipment as needed. 2. Patient will complete toileting with independence. 3. Patient will tolerate 25 minutes of OT treatment with as needed rest breaks to increase activity tolerance for ADLs. 4. Patient will complete functional transfers with independence and adaptive equipment as needed. 5. Patient will completed grooming independently at sink level in standing. Timeframe: 7 visits       OCCUPATIONAL THERAPY: Initial Assessment 6/27/2017  INPATIENT: Hospital Day: 3  [place] / Product Type: Medicare /      NAME/AGE/GENDER: Flavia Ortez is a 67 y.o. female    PRIMARY DIAGNOSIS:  Melena [K92.1] GI bleed GI bleed  Procedure(s) (LRB):  ESOPHAGOGASTRODUODENOSCOPY (EGD) (N/A)   CLIP PLACEMENT (N/A)  1 Day Post-Op  ICD-10: Treatment Diagnosis:        · Generalized Muscle Weakness (M62.81)  · Other lack of cordination (R27.8)  · Difficulty in walking, Not elsewhere classified (R26.2)   Precautions/Allergies:        falls,  Codeine and Motrin [ibuprofen]       ASSESSMENT:      Ms. Tam Perez is 68 yo female in ICU after a GI bleed with repair. Today presents with decreased self care, decreased functional mobility and decreased gait. At baseline, she admits that she has constant back pain  and admits that she is dependent on pain medicine. She has had several injections and surgery in 2013 with no help. 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  It is numb and weak. She uses a cane at all times. She was independent bathing and dressing at home and had help from her  with IADLs. Not driving. B UE are WFLs for basic self care tasks though are generally weak. She immigrated to the [place] from [facility] 38 years ago.   She moved pretty well today despite her complain of 6/10 pain. She was able to get out of bed without assist. Reviewed log roll and back safety. She was able to ambulate short distances in her ICU room using me as her cane on the right side to ambulate. She would like to shower but we don't have those facilities available while in ICU. Waiting bed transfer to floor. Ms. Shiva Mendes is likely close to her baseline, but would benefit from skilled OT (medically necessary) while here in hospital to make sure she is at her prior level of function prior to discharge home with sposue. Do not anticipate any needs at discharge, but recommended grab bars, a shower chair and hand held nozzle for safer showering at home. Pt appreciative. Left up in recliner with all needs in reach. RN aware. This section established at most recent assessment   PROBLEM LIST (Impairments causing functional limitations):  1. Decreased Strength  2. Decreased ADL/Functional Activities  3. Decreased Transfer Abilities  4. Decreased Balance  5. Increased Pain    INTERVENTIONS PLANNED: (Benefits and precautions of occupational therapy have been discussed with the patient.)  1. Activities of daily living training  2. Adaptive equipment training  3. Balance training  4. Clothing management  5. Community reintergration  6. Donning&doffing training  7. Group therapy  8. Therapeutic activity  9. Therapeutic exercise  10. Family training      TREATMENT PLAN: Frequency/Duration: Follow patient 3x per week to address above goals. Rehabilitation Potential For Stated Goals: GOOD      RECOMMENDED REHABILITATION/EQUIPMENT: (at time of discharge pending progress): Continue Skilled Therapy. OCCUPATIONAL PROFILE AND HISTORY:   History of Present Injury/Illness (Reason for Referral):  Patient is a 67 y.o. female seen in consultation at the request of Dr. Mariana Tatum for evaluation of hematemesis and melena.   She presented to the ER today with hematemesis which began today after the onset of melena yesterday. She notes a chronic hx of constipation for which she often takes large doses of Dulcolax; this is turn causes abdominal cramping and often, nausea and vomiting. She noted loose black stools yesterday, despite no dose of Dulcolax, and today began to vomit with red blood noted with initial emesis. She reports chronic pain and denies use of nsaids for this but does note daily use of steroids for pulmonary fibrosis. Her breathing does not seem worsened after the emesis but she does report weakness and chills. She was evaluated by the hospitalist in the ER with hgb 7.9 on arrival.  She has had no emesis or melena since arrival.  She is to receive transfusion of PRBC and is npo. She has been placed on a Protonix gtt. She denies prior hx of gi bleed. Past Medical History/Comorbidities:   Ms. Sarabjit Bynum  has no past medical history on file. Ms. Sarabjit Bynum  has a past surgical history that includes back surgery (2013); other surgical (2011); hysterectomy; cholecystectomy; and back surgery (July, 2015). Social History/Living Environment:   Home Environment: Private residence  # Steps to Enter: 0  One/Two Story Residence: One story  Living Alone: No  Support Systems: Spouse/Significant Other/Partner  Patient Expects to be Discharged to[de-identified] Private residence  Current DME Used/Available at Home: Cane, straight, Oxygen, portable, Walker, rolling  Tub or Shower Type: Tub/Shower combination recommend shower chair, HH nozzle, and grab bars  Prior Level of Function/Work/Activity:  Lives with spouse, was independent with ADLs, occasional assist with shoes and socks if back really hurting, not driving, she and spouse split house work, uses cane for ambulation.   Dominant Side:         RIGHT   Number of Personal Factors/Comorbidities that affect the Plan of Care: Extensive review of physical, cognitive, and psychosocial performance (3+):  HIGH COMPLEXITY   ASSESSMENT OF OCCUPATIONAL PERFORMANCE[de-identified]   Activities of Daily Living:           Basic ADLs (From Assessment) Complex ADLs (From Assessment)   Basic ADL  Feeding: Modified independent  Oral Facial Hygiene/Grooming: Setup  Bathing: Minimum assistance  Upper Body Dressing: Setup  Lower Body Dressing: Minimum assistance  Toileting: Minimum assistance Instrumental ADL  Meal Preparation: Minimum assistance  Homemaking: Minimum assistance  Medication Management: Setup  Financial Management: Setup   Grooming/Bathing/Dressing Activities of Daily Living     Cognitive Retraining  Safety/Judgement: Awareness of environment; Fall prevention                 Functional Transfers  Toilet Transfer : Contact guard assistance (from lower surface)  Tub Transfer: Moderate assistance  Shower Transfer: Contact guard assistance (on wet surface)     Bed/Mat Mobility  Rolling: Supervision  Supine to Sit: Supervision; Additional time  Sit to Supine: Supervision; Additional time  Sit to Stand: Supervision  Bed to Chair: Supervision; Additional time  Scooting: Supervision          Most Recent Physical Functioning:   Gross Assessment:  AROM: Generally decreased, functional  PROM: Generally decreased, functional  Strength: Generally decreased, functional  Coordination: Generally decreased, functional  Tone: Normal  Sensation: Intact               Posture:  Posture (WDL): Within defined limits  Balance:  Sitting: Intact  Standing: Impaired  Standing - Static: Fair  Standing - Dynamic : Fair Bed Mobility:  Rolling: Supervision  Supine to Sit: Supervision; Additional time  Sit to Supine: Supervision; Additional time  Scooting: Supervision  Wheelchair Mobility:     Transfers:  Sit to Stand: Supervision  Stand to Sit: Supervision  Bed to Chair: Supervision; Additional time              Patient Vitals for the past 6 hrs:       BP SpO2 O2 Flow Rate (L/min) Pulse   06/27/17 1001 113/55 100 % - 66   06/27/17 1030 121/71 100 % - 74   06/27/17 1101 124/72 99 % - 83   06/27/17 1132 - - 2 l/min -   17 1201 97/54 90 % - 80   17 1228 - 91 % - 69   17 1231 105/59 - - -   17 1306 114/67 93 % - 77        Mental Status  Neurologic State: Alert  Orientation Level: Appropriate for age, Oriented X4  Cognition: Appropriate for age attention/concentration, Follows commands  Perception: Appears intact  Perseveration: No perseveration noted  Safety/Judgement: Awareness of environment, Fall prevention                               Physical Skills Involved:  1. Balance  2. Strength  3. Activity Tolerance  4. Pain (Chronic) Cognitive Skills Affected (resulting in the inability to perform in a timely and safe manner):  1. Immediate Memory  2. Short Term Recall  3. Sustained Attention  4. Divided Attention  5. Comprehension Psychosocial Skills Affected:  1. Habits/Routines  2. Environmental Adaptation  3. Social Interaction  4. Emotional Regulation  5. Self-Awareness  6. Awareness of Others  7. Social Roles   Number of elements that affect the Plan of Care: 5+:  HIGH COMPLEXITY   CLINICAL DECISION MAKIN [facility] Inpatient Short Form  How much help from another person does the patient currently need. .. Total A Lot A Little None   1. Putting on and taking off regular lower body clothing?   [ ] 1   [ ] 2   [X] 3   [ ] 4   2. Bathing (including washing, rinsing, drying)? [ ] 1   [ ] 2   [X] 3   [ ] 4   3. Toileting, which includes using toilet, bedpan or urinal?   [ ] 1   [ ] 2   [X] 3   [ ] 4   4. Putting on and taking off regular upper body clothing?   [ ] 1   [ ] 2   [X] 3   [ ] 4   5. Taking care of personal grooming such as brushing teeth? [ ] 1   [ ] 2   [ ] 3   [X] 4   6. Eating meals? [ ] 1   [ ] 2   [ ] 3   [X] 4   © , Trustees of [address] [place] [address] [address] [address], under license to [employer].  All rights reserved    Score:  Initial: 20, completed 2017 Most Recent: X (Date: -- )     Interpretation of Tool:  Represents activities that are increasingly more difficult (i.e. Bed mobility, Transfers, Gait). Score 24 23 22-20 19-15 14-10 9-7 6       Modifier CH CI CJ CK CL CM CN         · Self Care:               - CURRENT STATUS:    CJ - 20%-39% impaired, limited or restricted               - GOAL STATUS:           CI - 1%-19% impaired, limited or restricted               - D/C STATUS:                       ---------------To be determined---------------  [place] / Product Type: Medicare /       Medical Necessity:     · Patient demonstrates good rehab potential due to higher previous functional level. Reason for Services/Other Comments:  · Patient continues to require skilled intervention due to s/p above and decreased ADLs and mobility. Use of outcome tool(s) and clinical judgement create a POC that gives a: MODERATE COMPLEXITY             TREATMENT:   (In addition to Assessment/Re-Assessment sessions the following treatments were rendered)      Pre-treatment Symptoms/Complaints:  Patient was agreeable to get out of bed for evaluation  Pain: Initial:   Pain Intensity 1: 6  Pain Location 1: Back  Pain Intervention(s) 1: Ambulation/Increased Activity, Emotional support, Nurse notified, Repositioned, Rest  Post Session:  Same, chronic      Assessment/Reassessment only, no treatment provided today     Braces/Orthotics/Lines/Etc:   · IV  · O2 Device: Nasal cannula  Treatment/Session Assessment:    · Response to Treatment:  Tolerated well  · Interdisciplinary Collaboration:  · Occupational Therapist  · Registered Nurse  ·   · After treatment position/precautions:  · Up in chair  · Bed/Chair-wheels locked  · Bed in low position  · Call light within reach  · RN notified  · B feet elevated, tray table beside pt  · Compliance with Program/Exercises: Will assess as treatment progresses. Compliant today. · Recommendations/Intent for next treatment session:   \"Next visit will focus on advancements to more challenging activities and reduction in assistance provided\".   Total Treatment Duration:  20 mins  OT Patient Time In/Time Out  Time In: 1500  Time Out: Yesenia Mcdaniel [address], VERNA Zabala MS, OTR/L

## 2017-06-28 LAB
ALBUMIN SERPL BCP-MCNC: 2.4 G/DL (ref 3.2–4.6)
ALBUMIN/GLOB SERPL: 0.9 {RATIO} (ref 1.2–3.5)
ALP SERPL-CCNC: 41 U/L (ref 50–136)
ALT SERPL-CCNC: 12 U/L (ref 12–65)
ANION GAP BLD CALC-SCNC: 10 MMOL/L (ref 7–16)
AST SERPL W P-5'-P-CCNC: 21 U/L (ref 15–37)
BILIRUB SERPL-MCNC: 1.5 MG/DL (ref 0.2–1.1)
BUN SERPL-MCNC: 11 MG/DL (ref 8–23)
CALCIUM SERPL-MCNC: 8 MG/DL (ref 8.3–10.4)
CHLORIDE SERPL-SCNC: 108 MMOL/L (ref 98–107)
CO2 SERPL-SCNC: 26 MMOL/L (ref 21–32)
CREAT SERPL-MCNC: 0.7 MG/DL (ref 0.6–1)
GLOBULIN SER CALC-MCNC: 2.8 G/DL (ref 2.3–3.5)
GLUCOSE SERPL-MCNC: 74 MG/DL (ref 65–100)
HCT VFR BLD AUTO: 27.4 % (ref 35.8–46.3)
HCT VFR BLD AUTO: 28.2 % (ref 35.8–46.3)
HCT VFR BLD AUTO: 30 % (ref 35.8–46.3)
HGB BLD-MCNC: 10 G/DL (ref 11.7–15.4)
HGB BLD-MCNC: 9.2 G/DL (ref 11.7–15.4)
HGB BLD-MCNC: 9.4 G/DL (ref 11.7–15.4)
MAGNESIUM SERPL-MCNC: 1.7 MG/DL (ref 1.8–2.4)
POTASSIUM SERPL-SCNC: 3.6 MMOL/L (ref 3.5–5.1)
PROT SERPL-MCNC: 5.2 G/DL (ref 6.3–8.2)
SODIUM SERPL-SCNC: 144 MMOL/L (ref 136–145)

## 2017-06-28 PROCEDURE — 36415 COLL VENOUS BLD VENIPUNCTURE: CPT | Performed by: INTERNAL MEDICINE

## 2017-06-28 PROCEDURE — 65270000029 HC RM PRIVATE

## 2017-06-28 PROCEDURE — 80053 COMPREHEN METABOLIC PANEL: CPT | Performed by: INTERNAL MEDICINE

## 2017-06-28 PROCEDURE — 85018 HEMOGLOBIN: CPT | Performed by: INTERNAL MEDICINE

## 2017-06-28 PROCEDURE — 74011250637 HC RX REV CODE- 250/637: Performed by: INTERNAL MEDICINE

## 2017-06-28 PROCEDURE — 74011250636 HC RX REV CODE- 250/636: Performed by: EMERGENCY MEDICINE

## 2017-06-28 PROCEDURE — 83735 ASSAY OF MAGNESIUM: CPT | Performed by: INTERNAL MEDICINE

## 2017-06-28 PROCEDURE — 74011250636 HC RX REV CODE- 250/636: Performed by: INTERNAL MEDICINE

## 2017-06-28 PROCEDURE — C9113 INJ PANTOPRAZOLE SODIUM, VIA: HCPCS | Performed by: EMERGENCY MEDICINE

## 2017-06-28 PROCEDURE — 74011636637 HC RX REV CODE- 636/637: Performed by: INTERNAL MEDICINE

## 2017-06-28 RX ORDER — HYDROMORPHONE HYDROCHLORIDE 2 MG/1
4 TABLET ORAL
Status: DISCONTINUED | OUTPATIENT
Start: 2017-06-28 | End: 2017-06-29 | Stop reason: HOSPADM

## 2017-06-28 RX ORDER — MAGNESIUM SULFATE HEPTAHYDRATE 40 MG/ML
2 INJECTION, SOLUTION INTRAVENOUS ONCE
Status: COMPLETED | OUTPATIENT
Start: 2017-06-28 | End: 2017-06-28

## 2017-06-28 RX ADMIN — ONDANSETRON 4 MG: 2 INJECTION INTRAMUSCULAR; INTRAVENOUS at 09:52

## 2017-06-28 RX ADMIN — SODIUM CHLORIDE 80 MG: 900 INJECTION, SOLUTION INTRAVENOUS at 04:00

## 2017-06-28 RX ADMIN — PREDNISONE 5 MG: 5 TABLET ORAL at 07:22

## 2017-06-28 RX ADMIN — SODIUM CHLORIDE 100 ML/HR: 900 INJECTION, SOLUTION INTRAVENOUS at 04:06

## 2017-06-28 RX ADMIN — MAGNESIUM SULFATE HEPTAHYDRATE 2 G: 40 INJECTION, SOLUTION INTRAVENOUS at 08:46

## 2017-06-28 RX ADMIN — HYDROMORPHONE HYDROCHLORIDE 4 MG: 4 TABLET ORAL at 14:04

## 2017-06-28 RX ADMIN — ATORVASTATIN CALCIUM 40 MG: 40 TABLET, FILM COATED ORAL at 21:31

## 2017-06-28 RX ADMIN — LEVOTHYROXINE SODIUM 50 MCG: 50 TABLET ORAL at 07:22

## 2017-06-28 RX ADMIN — Medication 10 ML: at 05:30

## 2017-06-28 RX ADMIN — HYDROMORPHONE HYDROCHLORIDE 1 MG: 1 INJECTION, SOLUTION INTRAMUSCULAR; INTRAVENOUS; SUBCUTANEOUS at 07:22

## 2017-06-28 RX ADMIN — SODIUM CHLORIDE 100 ML/HR: 900 INJECTION, SOLUTION INTRAVENOUS at 14:04

## 2017-06-28 RX ADMIN — STANDARDIZED SENNA CONCENTRATE AND DOCUSATE SODIUM 1 TABLET: 8.6; 5 TABLET, FILM COATED ORAL at 08:45

## 2017-06-28 RX ADMIN — Medication 10 ML: at 14:00

## 2017-06-28 RX ADMIN — ONDANSETRON 4 MG: 2 INJECTION INTRAMUSCULAR; INTRAVENOUS at 14:04

## 2017-06-28 RX ADMIN — HYDROMORPHONE HYDROCHLORIDE 4 MG: 4 TABLET ORAL at 21:31

## 2017-06-28 NOTE — PROGRESS NOTES
Bedside shift report received from [facility]. Pt resting quietly in bed. Alert and oriented x4. Pupils equal and reactive. Pulses palpable and present +2 all extremities. Lung sounds clear, 02 sats 90's on 3LNC. Pt wears 3L at home QHS. Bowel sounds active all quadrants. Voids yellow clear urine via bedpan. Pt has chronic back and leg pain- PRN meds available. Pt has no apparent distress and denies any needs at this time. Will continue to monitor. Transfer orders are in place- still waiting for a room.

## 2017-06-28 NOTE — INTERDISCIPLINARY ROUNDS
Interdisciplinary team rounds were held 6/28/2017 with the following team members:Care Management, Nursing, Nurse Practitioner, Nutrition, Palliative Care, Pastoral Care, Pharmacy, Physical Therapy, Physician, Physician's Assistant, Respiratory Therapy and Clinical Coordinator and the patient. Plan of care discussed. See clinical pathway and/or care plan for interventions and desired outcomes.

## 2017-06-28 NOTE — PROGRESS NOTES
Patient is alert and oriented and follows all commands. NC @ 2L with O2 sats 100%. NSR, BP stable. Bilateral breath sounds are clear and diminished. Abdomen is semi-soft and intact with no tenderness on palpation and active bowel sounds x4. Patient is voiding clear, yellow urine. No muscle weakness noted. Cap refill less than 3 seconds and pulses palpable to all extremities. No edema noted. Skin is warm, dry and fragile, but there are no skin issues noted. VSS, NAD.

## 2017-06-28 NOTE — PROGRESS NOTES
Bedside shift change report given to [facility] (oncoming nurse) by Luis Michael RN (offgoing nurse). Report included the following information SBAR, Kardex, ED Summary, Procedure Summary, Intake/Output, MAR, Recent Results and Cardiac Rhythm NSR.

## 2017-06-28 NOTE — PROGRESS NOTES
[facility] CRITICAL CARE OUTREACH NURSE PROGRESS REPORT      SUBJECTIVE: Called to assess patient secondary to outreach protocol. MEWS Score: 0 (06/28/17 1046)  Vitals:    06/28/17 0545 06/28/17 0601 06/28/17 0701 06/28/17 1046   BP:  144/69 127/69 116/63   Pulse:  80 66 72   Resp:  (!) 48 10 12   Temp:   98.1 °F (36.7 °C) 98.1 °F (36.7 °C)   SpO2:  97% 99% 93%   Weight: 54 kg (119 lb 0.8 oz)      Height:              LAB DATA:    Recent Labs      06/28/17   0435  06/27/17   0350  06/26/17   0400   NA  144  143  145   K  3.6  3.5  4.5   CL  108*  108*  111*   CO2  26  27  25   AGAP  10  8  9   GLU  74  71  76   BUN  11  16  25*   CREA  0.70  0.70  0.65   GFRAA  >60  >60  >60   GFRNA  >60  >60  >60   CA  8.0*  7.8*  7.4*   MG  1.7*   --    --    ALB  2.4*  2.3*  2.3*   TP  5.2*  5.0*  5.0*   GLOB  2.8  2.7  2.7   AGRAT  0.9*  0.9*  0.9*   ALT  12  11*  12        Recent Labs      06/28/17   1342  06/28/17   0435  06/27/17   1427   HGB  9.4*  9.2*  9.6*   HCT  28.2*  27.4*  28.0*          OBJECTIVE: On arrival to room, I found patient to be resting in bed. Pain Assessment  Pain Intensity 1: 7 (06/28/17 0722)  Pain Location 1: Generalized, Back, Leg  Pain Intervention(s) 1: Medication (see MAR)  Patient Stated Pain Goal: 0                                 ASSESSMENT:  Pt alert and oriented, no pain or distress noted. Lungs CTA. 02 sat 98% on RA. No immediate concerns at this time. PLAN:      Will continue to follow up per outreach protocol.     Signed By:   Deniz Wasserman RN    June 28, 2017 3:50 PM

## 2017-06-28 NOTE — PROGRESS NOTES
Chart reviewed. PT/OT seen pt and do not anticipate any d/c needs. Pt has PCP and able to get Rx with drug plan.

## 2017-06-28 NOTE — PROGRESS NOTES
TRANSFER - IN REPORT:    Verbal report received from [address] [place] [address] on Herma Schirmer  being received from ICU for routine progression of care      Report consisted of patients Situation, Background, Assessment and   Recommendations(SBAR). Information from the following report(s) SBAR, Kardex, ED Summary, Intake/Output and Recent Results was reviewed with the receiving nurse. Opportunity for questions and clarification was provided. Assessment completed upon patients arrival to unit and care assumed.

## 2017-06-28 NOTE — PROGRESS NOTES
Pt arrived to floor with ICU nurses. Dual skin assessment completed with JOSUÉ pelaez. Skin c/d/i. Pt oriented to room and call light within reach. No complaints at this time.

## 2017-06-28 NOTE — PROGRESS NOTES
Hospitalist Progress Note    2017  Admit Date: 2017  1:48 PM   NAME: Higinio Rodriguez   :  1940   MRN:  [id number]   Attending: Ubaldo Gipson DO  PCP:  Kim Ramachandran NP    SUBJECTIVE:   67year old with Idiopathic Pulmonary Fibrosis (on chronic Prednisone & 3LNC @ night), hypothyroidism, chronic back pain (on chronic narcotics), & chronic constipation presented to the ER with one day of melena and 6-8 hours of hematemesis with associated near syncope and hypotension. Hgb was 5.9 s/p 3u prbcs and EGD showed MWT with non bleeding Vessel s/p Clipping. GI managing.     : Feels better, no ABd pain, CP or SOB. BP stable. One maroon colored stool overnight    Nursing notes and chart reviewed. Review of Systems negative with exception of pertinent positives noted above. PHYSICAL EXAM     Visit Vitals    /69    Pulse 80    Temp 98.3 °F (36.8 °C)    Resp (!) 48    Ht 5' 1\" (1.549 m)    Wt 54 kg (119 lb 0.8 oz)    SpO2 97%    Breastfeeding No    BMI 22.49 kg/m2      Temp (24hrs), Av.3 °F (36.8 °C), Min:97.9 °F (36.6 °C), Max:98.6 °F (37 °C)    Oxygen Therapy  O2 Sat (%): 97 % (17 0601)  Pulse via Oximetry: 76 beats per minute (17 0601)  O2 Device: Nasal cannula (17 0301)  O2 Flow Rate (L/min): 2 l/min (17 0301)    Intake/Output Summary (Last 24 hours) at 17 0756  Last data filed at 17 0530   Gross per 24 hour   Intake             3004 ml   Output             2200 ml   Net              804 ml       General: No acute distress. Alert.    Lungs:  CTABL. Heart:  RRR, no murmur, rub, or gallop  Abdomen: Soft, non-distended, non-tender, +bs  Extremities: No cyanosis or clubbing. Neurologic:  No focal deficits. Moves all extremities. Skin:  No rash noted  Psych:  Normal Affect    LABS AND STUDIES:  Personally reviewed all labs, meds, and studies for past 24hrs.       ASSESSMENT      Active Hospital Problems    Diagnosis Date Noted    GI bleed 06/25/2017    Hematemesis 06/25/2017    Acute upper gastrointestinal bleeding 06/25/2017    Melena 06/25/2017    Hypotension 06/25/2017    Acute prerenal azotemia 06/25/2017           PLAN:  · Acute Blood Loss Anemia 2/2 Upper GIBleed: s/p 3u, monitor H&H  · GI Bleed due to [employer] Tear: s/p EGD clipping, on protonix gtt for 72 hrs, avoid Nausea/retching  · Advance to full liquids  · IPF on chronic steroids and O2: on prednisone and Oxygen  · Chronic Back pain and Constipation: Monitor, on stool softeners    Dispo: likely in am    DVT ppx:  scds  Discussed plan with pt who is in agreement. All questions answered.     Signed By: Matilde Chao MD     June 28, 2017

## 2017-06-29 VITALS
WEIGHT: 119.05 LBS | SYSTOLIC BLOOD PRESSURE: 101 MMHG | HEART RATE: 91 BPM | OXYGEN SATURATION: 90 % | HEIGHT: 61 IN | DIASTOLIC BLOOD PRESSURE: 58 MMHG | TEMPERATURE: 98.1 F | BODY MASS INDEX: 22.48 KG/M2 | RESPIRATION RATE: 18 BRPM

## 2017-06-29 LAB
ABO + RH BLD: NORMAL
ALBUMIN SERPL BCP-MCNC: 2.5 G/DL (ref 3.2–4.6)
ALBUMIN/GLOB SERPL: 0.8 {RATIO} (ref 1.2–3.5)
ALP SERPL-CCNC: 49 U/L (ref 50–136)
ALT SERPL-CCNC: 14 U/L (ref 12–65)
ANION GAP BLD CALC-SCNC: 9 MMOL/L (ref 7–16)
AST SERPL W P-5'-P-CCNC: 25 U/L (ref 15–37)
BILIRUB SERPL-MCNC: 1.1 MG/DL (ref 0.2–1.1)
BLD PROD TYP BPU: NORMAL
BLOOD GROUP ANTIBODIES SERPL: NORMAL
BPU ID: NORMAL
BUN SERPL-MCNC: 5 MG/DL (ref 8–23)
CALCIUM SERPL-MCNC: 7.9 MG/DL (ref 8.3–10.4)
CHLORIDE SERPL-SCNC: 108 MMOL/L (ref 98–107)
CO2 SERPL-SCNC: 27 MMOL/L (ref 21–32)
CREAT SERPL-MCNC: 0.86 MG/DL (ref 0.6–1)
CROSSMATCH RESULT,%XM: NORMAL
GLOBULIN SER CALC-MCNC: 3.3 G/DL (ref 2.3–3.5)
GLUCOSE SERPL-MCNC: 84 MG/DL (ref 65–100)
HCT VFR BLD AUTO: 28.4 % (ref 35.8–46.3)
HGB BLD-MCNC: 9.4 G/DL (ref 11.7–15.4)
POTASSIUM SERPL-SCNC: 3.4 MMOL/L (ref 3.5–5.1)
PROT SERPL-MCNC: 5.8 G/DL (ref 6.3–8.2)
SODIUM SERPL-SCNC: 144 MMOL/L (ref 136–145)
SPECIMEN EXP DATE BLD: NORMAL
STATUS OF UNIT,%ST: NORMAL
UNIT DIVISION, %UDIV: 0

## 2017-06-29 PROCEDURE — 74011250637 HC RX REV CODE- 250/637: Performed by: INTERNAL MEDICINE

## 2017-06-29 PROCEDURE — 85018 HEMOGLOBIN: CPT | Performed by: INTERNAL MEDICINE

## 2017-06-29 PROCEDURE — 80053 COMPREHEN METABOLIC PANEL: CPT | Performed by: INTERNAL MEDICINE

## 2017-06-29 PROCEDURE — 74011636637 HC RX REV CODE- 636/637: Performed by: INTERNAL MEDICINE

## 2017-06-29 PROCEDURE — 36415 COLL VENOUS BLD VENIPUNCTURE: CPT | Performed by: INTERNAL MEDICINE

## 2017-06-29 RX ORDER — ONDANSETRON 8 MG/1
8 TABLET, ORALLY DISINTEGRATING ORAL
Qty: 21 TAB | Refills: 0 | Status: SHIPPED | OUTPATIENT
Start: 2017-06-29

## 2017-06-29 RX ORDER — PANTOPRAZOLE SODIUM 40 MG/1
40 TABLET, DELAYED RELEASE ORAL 2 TIMES DAILY
Qty: 60 TAB | Refills: 0 | Status: SHIPPED | OUTPATIENT
Start: 2017-06-29

## 2017-06-29 RX ORDER — DOCUSATE SODIUM 100 MG/1
100 CAPSULE, LIQUID FILLED ORAL 2 TIMES DAILY
Qty: 60 CAP | Refills: 2 | Status: SHIPPED
Start: 2017-06-29 | End: 2017-09-27

## 2017-06-29 RX ADMIN — STANDARDIZED SENNA CONCENTRATE AND DOCUSATE SODIUM 1 TABLET: 8.6; 5 TABLET, FILM COATED ORAL at 10:48

## 2017-06-29 RX ADMIN — HYDROMORPHONE HYDROCHLORIDE 4 MG: 4 TABLET ORAL at 03:20

## 2017-06-29 RX ADMIN — PREDNISONE 5 MG: 5 TABLET ORAL at 10:48

## 2017-06-29 RX ADMIN — LEVOTHYROXINE SODIUM 50 MCG: 50 TABLET ORAL at 10:48

## 2017-06-29 NOTE — PROGRESS NOTES
[facility] CRITICAL CARE OUTREACH NURSE PROGRESS REPORT      SUBJECTIVE: Called to assess patient secondary to outreach protocol. MEWS Score: 1 (06/28/17 1616)  Vitals:    06/28/17 0601 06/28/17 0701 06/28/17 1046 06/28/17 1616   BP: 144/69 127/69 116/63 127/72   Pulse: 80 66 72 77   Resp: (!) 48 10 12 17   Temp:  98.1 °F (36.7 °C) 98.1 °F (36.7 °C) 97.9 °F (36.6 °C)   SpO2: 97% 99% 93% 92%   Weight:       Height:               LAB DATA:    Recent Labs      06/28/17   0435  06/27/17   0350  06/26/17   0400   NA  144  143  145   K  3.6  3.5  4.5   CL  108*  108*  111*   CO2  26  27  25   AGAP  10  8  9   GLU  74  71  76   BUN  11  16  25*   CREA  0.70  0.70  0.65   GFRAA  >60  >60  >60   GFRNA  >60  >60  >60   CA  8.0*  7.8*  7.4*   MG  1.7*   --    --    ALB  2.4*  2.3*  2.3*   TP  5.2*  5.0*  5.0*   GLOB  2.8  2.7  2.7   AGRAT  0.9*  0.9*  0.9*   ALT  12  11*  12        Recent Labs      06/28/17   1342  06/28/17   0435  06/27/17   1427   HGB  9.4*  9.2*  9.6*   HCT  28.2*  27.4*  28.0*          OBJECTIVE: On arrival to room, I found patient to be resting in bed, LRL on RA. Pain Assessment  Pain Intensity 1: 5 (06/28/17 1616)  Pain Location 1: Generalized  Pain Intervention(s) 1: Refused  Patient Stated Pain Goal: 5                                 ASSESSMENT:  Patient resting in bed. Spoke with primary RN, she states is rounding now to see patient. No issues identified during shift change. Back into room, patient briefly awake. Patient denies any needs at current, states she wishes to rest. LS CTA, no dyspnea or hypoxia apparent. No immediate concerns at this time. PLAN:  Continue to follow per outreach protocol.

## 2017-06-29 NOTE — PROGRESS NOTES
Care Management Interventions  PCP Verified by CM: Yes  Transition of Care Consult (CM Consult): Discharge Planning  Physical Therapy Consult: No  Occupational Therapy Consult: No  Plan discussed with Pt/Family/Caregiver: Yes  Freedom of Choice Offered: Yes  Discharge Location  Discharge Placement: Home     DC to home with no needs. Matt Chanel  Ezequiel Holstein

## 2017-06-29 NOTE — PROGRESS NOTES
Discharge instructions and prescriptions given and reviewed with pt, verbalizes understanding, medication side effect sheet reviewed with pt, pt to be discharged home, will not have ride available until 2 pm.

## 2017-06-29 NOTE — DISCHARGE INSTRUCTIONS
DISCHARGE SUMMARY from Nurse    The following personal items are in your possession at time of discharge:    Dental Appliances: At home  Visual Aid: Glasses, At bedside     Home Medications: None  Jewelry: Ring (7 rings)  Clothing: Sent home  Other Valuables: Cell Phone, Eyeglasses             PATIENT INSTRUCTIONS:    After general anesthesia or intravenous sedation, for 24 hours or while taking prescription Narcotics:  · Limit your activities  · Do not drive and operate hazardous machinery  · Do not make important personal or business decisions  · Do  not drink alcoholic beverages  · If you have not urinated within 8 hours after discharge, please contact your surgeon on call. Report the following to your surgeon:  · Excessive pain, swelling, redness or odor of or around the surgical area  · Temperature over 100.5  · Nausea and vomiting lasting longer than 4 hours or if unable to take medications  · Any signs of decreased circulation or nerve impairment to extremity: change in color, persistent  numbness, tingling, coldness or increase pain  · Any questions        What to do at Home:  Recommended activity: Activity as tolerated, regular diet    If you experience any of the following symptoms blood in stool, vomiting blood, extreme dizziness/fatiue, or shortness of breath, please follow up with GI physician. *  Please give a list of your current medications to your Primary Care Provider. *  Please update this list whenever your medications are discontinued, doses are      changed, or new medications (including over-the-counter products) are added. *  Please carry medication information at all times in case of emergency situations. These are general instructions for a healthy lifestyle:    No smoking/ No tobacco products/ Avoid exposure to second hand smoke    Surgeon General's Warning:  Quitting smoking now greatly reduces serious risk to your health.     Obesity, smoking, and sedentary lifestyle greatly increases your risk for illness    A healthy diet, regular physical exercise & weight monitoring are important for maintaining a healthy lifestyle    You may be retaining fluid if you have a history of heart failure or if you experience any of the following symptoms:  Weight gain of 3 pounds or more overnight or 5 pounds in a week, increased swelling in our hands or feet or shortness of breath while lying flat in bed. Please call your doctor as soon as you notice any of these symptoms; do not wait until your next office visit. Recognize signs and symptoms of STROKE:    F-face looks uneven    A-arms unable to move or move unevenly    S-speech slurred or non-existent    T-time-call 911 as soon as signs and symptoms begin-DO NOT go       Back to bed or wait to see if you get better-TIME IS BRAIN. Warning Signs of HEART ATTACK     Call 911 if you have these symptoms:   Chest discomfort. Most heart attacks involve discomfort in the center of the chest that lasts more than a few minutes, or that goes away and comes back. It can feel like uncomfortable pressure, squeezing, fullness, or pain.  Discomfort in other areas of the upper body. Symptoms can include pain or discomfort in one or both arms, the back, neck, jaw, or stomach.  Shortness of breath with or without chest discomfort.  Other signs may include breaking out in a cold sweat, nausea, or lightheadedness. Don't wait more than five minutes to call 911 - MINUTES MATTER! Fast action can save your life. Calling 911 is almost always the fastest way to get lifesaving treatment. Emergency Medical Services staff can begin treatment when they arrive -- up to an hour sooner than if someone gets to the hospital by car. The discharge information has been reviewed with the patient. The patient verbalized understanding.     Discharge medications reviewed with the patient and appropriate educational materials and side effects teaching were provided. Gastrointestinal Bleeding: Care Instructions  Your Care Instructions    The digestive or gastrointestinal tract goes from the mouth to the anus. It is often called the GI tract. Bleeding can happen anywhere in the GI tract. It may be caused by an ulcer, an infection, or cancer. It may also be caused by medicines such as aspirin or ibuprofen. Light bleeding may not cause any symptoms at first. But if you continue to bleed for a while, you may feel very weak or tired. Sudden, heavy bleeding means you need to see a doctor right away. This kind of bleeding can be very dangerous. But it can usually be cured or controlled. The doctor may do some tests to find the cause of your bleeding. Follow-up care is a key part of your treatment and safety. Be sure to make and go to all appointments, and call your doctor if you are having problems. It's also a good idea to know your test results and keep a list of the medicines you take. How can you care for yourself at home? · Be safe with medicines. Take your medicines exactly as prescribed. Call your doctor if you think you are having a problem with your medicine. You will get more details on the specific medicines your doctor prescribes. · Do not take aspirin or other anti-inflammatory medicines, such as naproxen (Aleve) or ibuprofen (Advil, Motrin), without talking to your doctor first. Ask your doctor if it is okay to use acetaminophen (Tylenol). · Do not drink alcohol. · The bleeding may make you lose iron. So it's important to eat foods that have a lot of iron. These include red meat, shellfish, poultry, and eggs. They also include beans, raisins, whole-grain breads, and leafy green vegetables. If you want help planning meals, you can make an appointment with a dietitian. When should you call for help? Call 911 anytime you think you may need emergency care. For example, call if:  · You have sudden, severe belly pain.   · You vomit blood or what looks like coffee grounds. · You passed out (lost consciousness). · Your stools are maroon or very bloody. Call your doctor now or seek immediate medical care if:  · You are dizzy or lightheaded, or you feel like you may faint. · Your stools are black and look like tar, or they have streaks of blood. · You have belly pain. · You vomit or have nausea. · You have trouble swallowing, or it hurts when you swallow. Watch closely for changes in your health, and be sure to contact your doctor if:  · You do not get better as expected. Where can you learn more? Go to http://raul-forest.info/. Enter C944 in the search box to learn more about \"Gastrointestinal Bleeding: Care Instructions. \"  Current as of: March 20, 2017  Content Version: 11.3  © 5829-5132 [employer]. Care instructions adapted under license by [employer] (which disclaims liability or warranty for this information). If you have questions about a medical condition or this instruction, always ask your healthcare professional. [address] any warranty or liability for your use of this information.

## 2017-06-29 NOTE — DISCHARGE SUMMARY
Hospitalist Discharge Summary     Patient ID:  Apryl Eckert  [id number]  67 y.o.  1940  Admit date: 6/25/2017  1:48 PM  Discharge date and time: 6/29/2017  Attending: Fatuma Medeiros DO  PCP:  Madyson Segura NP  Treatment Team: Attending Provider: Fatuma Medeiros DO; Utilization Review: Parag Santana; Care Manager: Scott Temple RN    Principal Diagnosis GI bleed   Principal Problem:    GI bleed (6/25/2017)    Active Problems:    Hematemesis (6/25/2017)      Acute upper gastrointestinal bleeding (6/25/2017)      Melena (6/25/2017)      Hypotension (6/25/2017)      Acute prerenal azotemia (6/25/2017)           HPI:  'Apryl Eckert is a 67year old with Idiopathic Pulmonary Fibrosis (on chronic Prednisone & 3LNC @ night), hypothyroidism, chronic back pain (on chronic narcotics), & chronic constipation presented to the ER with one day of melena and 6-8 hours of hematemesis with associated near syncope and hypotension. The patient, and her son, who speaks good English, states that the patient has been \"fighting\" constipation due to narcotics for almost a year now. She has tried Linzess and other agents which did not help. She has been taking Dulcolax x 2-3 months and has had a hard time keeping it down with persistent vomiting.     She had been doing fine despite the vomiting until last evening when she started having melena. Then when she woke up this morning and was in her kitchen, she became really light headed and almost passed out then started vomiting bilious liquid that turned to bright red blood so she came to the ER.'    Hospital Course:  She was admitted to the ICU and treated with protonix gtt and received 5 units pRBCs. She had an EGD on 6/26 showing a Jayleen De León tear at the distal GE junction and 2 hemoclips were placed. She continued on protonix gtt through 6/29 and then transitioned to protonix 40 mg BID x 8 weeks, then daily on discharge.   On day of discharge, she denies concerns. She follows with [facility] (Dr. Yvonne Coles) and is recommended to follow with him in 1-2 weeks. Her hgb was stable at 9.4 on day of discharge. Significant Diagnostic Studies:       Labs: Results:       Chemistry Recent Labs      06/29/17   0700  06/28/17   0435  06/27/17   0350   GLU  84  74  71   NA  144  144  143   K  3.4*  3.6  3.5   CL  108*  108*  108*   CO2  27  26  27   BUN  5*  11  16   CREA  0.86  0.70  0.70   CA  7.9*  8.0*  7.8*   AGAP  9  10  8   AP  49*  41*  41*   TP  5.8*  5.2*  5.0*   ALB  2.5*  2.4*  2.3*   GLOB  3.3  2.8  2.7   AGRAT  0.8*  0.9*  0.9*      CBC w/Diff Recent Labs      06/29/17   0700  06/28/17   2250  06/28/17   1342   HGB  9.4*  10.0*  9.4*   HCT  28.4*  30.0*  28.2*      Cardiac Enzymes No results for input(s): CPK, CKND1, RUPA in the last 72 hours. No lab exists for component: CKRMB, TROIP   Coagulation No results for input(s): PTP, INR, APTT in the last 72 hours. No lab exists for component: INREXT    Lipid Panel No results found for: CHOL, CHOLPOCT, CHOLX, CHLST, CHOLV, 207210, HDL, LDL, LDLC, DLDLP, 307409, VLDLC, VLDL, TGLX, TRIGL, TRIGP, TGLPOCT, CHHD, CHHDX   BNP No results for input(s): BNPP in the last 72 hours. Liver Enzymes Recent Labs      06/29/17   0700   TP  5.8*   ALB  2.5*   AP  49*   SGOT  25      Thyroid Studies No results found for: T4, T3U, TSH, TSHEXT         Discharge Exam:  Visit Vitals    /58 (BP 1 Location: Left arm, BP Patient Position: At rest)    Pulse 91    Temp 98.1 °F (36.7 °C)    Resp 18    Ht 5' 1\" (1.549 m)    Wt 54 kg (119 lb 0.8 oz)    SpO2 90%    Breastfeeding No    BMI 22.49 kg/m2     General appearance: alert, cooperative, no distress, appears stated age  Lungs: clear to auscultation bilaterally  Heart: regular rate and rhythm, S1, S2 normal, no murmur, click, rub or gallop  Abdomen: soft, non-tender.  Bowel sounds normal. No masses,  no organomegaly  Extremities: no cyanosis or edema  Neurologic: Grossly normal    Disposition: home  Discharge Condition: stable  Patient Instructions:   Current Discharge Medication List      START taking these medications    Details   docusate sodium (COLACE) 100 mg capsule Take 1 Cap by mouth two (2) times a day for 90 days. Indications: constipation  Qty: 60 Cap, Refills: 2      ondansetron (ZOFRAN ODT) 8 mg disintegrating tablet Take 1 Tab by mouth every eight (8) hours as needed for Nausea. Qty: 21 Tab, Refills: 0         CONTINUE these medications which have CHANGED    Details   pantoprazole (PROTONIX) 40 mg tablet Take 1 Tab by mouth two (2) times a day. Take twice a day for 8 weeks, then drop back down to daily. Indications: Upper GI Bleed  Qty: 60 Tab, Refills: 0         CONTINUE these medications which have NOT CHANGED    Details   HYDROmorphone (DILAUDID) 4 mg tablet Take 4 mg by mouth every three (3) hours as needed for Pain. predniSONE (DELTASONE) 5 mg tablet Take 1 Tab by mouth daily (with breakfast). Starting Feb 14, 5mg daily x 1 week, Starting Feb 21, 5mg alternating with 2.5mg daily x 1 week; Starting Feb 28, 5mg every other day until seen back in the office. Qty: 30 Tab, Refills: 0      levothyroxine (SYNTHROID) 75 mcg tablet Take 50 mcg by mouth Daily (before breakfast). atorvastatin (LIPITOR) 10 mg tablet Take  by mouth daily. bisacodyl (DULCOLAX, BISACODYL,) 5 mg EC tablet Take 5 mg by mouth daily as needed for Constipation. Activity: Activity as tolerated  Diet: Regular Diet      Follow-up  ·   Dr. Rosa [facility]) in 1-2 weeks. Time spent to discharge patient 25 minutes  Signed:  Kathie Mendenhall.  Delvin Graf MD  6/29/2017  10:21 AM

## 2017-06-29 NOTE — PROGRESS NOTES
[place] [facility] CRITICAL CARE OUTREACH NURSE PROGRESS REPORT      SUBJECTIVE: In to assess patient secondary to transfer from [facility] 1626 on 6/28. MEWS Score: 2 (06/29/17 0418)  Vitals:    06/28/17 2003 06/29/17 0001 06/29/17 0418 06/29/17 0752   BP: 112/79 109/60 100/59 101/58   Pulse: 86 79 80 91   Resp: 18 18 18 18   Temp: 98.3 °F (36.8 °C) 97.7 °F (36.5 °C) 98.3 °F (36.8 °C) 98.1 °F (36.7 °C)   SpO2: 94% 90% 95% 90%   Weight:       Height:            LAB DATA:    Recent Labs      06/29/17   0700  06/28/17   0435  06/27/17   0350   NA  144  144  143   K  3.4*  3.6  3.5   CL  108*  108*  108*   CO2  27  26  27   AGAP  9  10  8   GLU  84  74  71   BUN  5*  11  16   CREA  0.86  0.70  0.70   GFRAA  >60  >60  >60   GFRNA  >60  >60  >60   CA  7.9*  8.0*  7.8*   MG   --   1.7*   --    ALB  2.5*  2.4*  2.3*   TP  5.8*  5.2*  5.0*   GLOB  3.3  2.8  2.7   AGRAT  0.8*  0.9*  0.9*   ALT  14  12  11*        Recent Labs      06/29/17   0700  06/28/17   2250  06/28/17   1342   HGB  9.4*  10.0*  9.4*   HCT  28.4*  30.0*  28.2*          OBJECTIVE: On arrival to room, I found patient to be sitting up in bed. Pain Assessment  Pain Intensity 1: 0 (06/29/17 0418)  Pain Location 1: Head  Pain Intervention(s) 1: Refused  Patient Stated Pain Goal: 0      ASSESSMENT:  Patient alert and oriented, very pleasant. No complaints of pain. HR 95, O2 sat 93% on room air. Per patient has not had a BM since yesterday, no active signs of bleeding. Patient wants to know when she can be discharged home. PLAN:  Continue to follow per ICU outreach protocol.

## 2017-06-29 NOTE — PROGRESS NOTES
Pt very unhappy with room on 6th floor due to how loud the air is blowing, tried to adjust but patient not satisfied.

## 2017-06-29 NOTE — PROGRESS NOTES
[facility] [address] CRITICAL CARE OUTREACH NURSE PROGRESS REPORT      SUBJECTIVE: Called to assess patient secondary to ICU outreach. MEWS Score: 1 (06/29/17 0001)  Vitals:    06/28/17 1046 06/28/17 1616 06/28/17 2003 06/29/17 0001   BP: 116/63 127/72 112/79 109/60   Pulse: 72 77 86 79   Resp: 12 17 18 18   Temp: 98.1 °F (36.7 °C) 97.9 °F (36.6 °C) 98.3 °F (36.8 °C) 97.7 °F (36.5 °C)   SpO2: 93% 92% 94% 90%   Weight:       Height:            LAB DATA:    Recent Labs      06/28/17   0435  06/27/17   0350  06/26/17   0400   NA  144  143  145   K  3.6  3.5  4.5   CL  108*  108*  111*   CO2  26  27  25   AGAP  10  8  9   GLU  74  71  76   BUN  11  16  25*   CREA  0.70  0.70  0.65   GFRAA  >60  >60  >60   GFRNA  >60  >60  >60   CA  8.0*  7.8*  7.4*   MG  1.7*   --    --    ALB  2.4*  2.3*  2.3*   TP  5.2*  5.0*  5.0*   GLOB  2.8  2.7  2.7   AGRAT  0.9*  0.9*  0.9*   ALT  12  11*  12        Recent Labs      06/28/17   2250  06/28/17   1342  06/28/17   0435   HGB  10.0*  9.4*  9.2*   HCT  30.0*  28.2*  27.4*          OBJECTIVE: On arrival to room, I found patient to be resting in bed on L side, primary RN at bedside. Pain Assessment  Pain Intensity 1: 5 (06/28/17 1616)  Pain Location 1: Generalized  Pain Intervention(s) 1: Refused  Patient Stated Pain Goal: 5                                 ASSESSMENT: Patient alert/oriented. Denies any dyspnea with good resp effort, O2 sat mid/low 90s on RA. Per primary RN, patient with intermittent HA over the course of the evening though refused IV pain meds. No apparent neurological distress. Patient complains of room being noisy, otherwise no complaints. States she is going to attempt to get some rest.      PLAN:  Continue to monitor per ICU outreach protocol.

## 2019-02-20 ENCOUNTER — APPOINTMENT (RX ONLY)
Dept: URBAN - METROPOLITAN AREA CLINIC 349 | Facility: CLINIC | Age: 79
Setting detail: DERMATOLOGY
End: 2019-02-20

## 2019-02-20 DIAGNOSIS — Z71.89 OTHER SPECIFIED COUNSELING: ICD-10-CM

## 2019-02-20 DIAGNOSIS — L89 PRESSURE ULCER: ICD-10-CM

## 2019-02-20 DIAGNOSIS — D22 MELANOCYTIC NEVI: ICD-10-CM

## 2019-02-20 DIAGNOSIS — L82.1 OTHER SEBORRHEIC KERATOSIS: ICD-10-CM

## 2019-02-20 PROBLEM — M12.9 ARTHROPATHY, UNSPECIFIED: Status: ACTIVE | Noted: 2019-02-20

## 2019-02-20 PROBLEM — D22.5 MELANOCYTIC NEVI OF TRUNK: Status: ACTIVE | Noted: 2019-02-20

## 2019-02-20 PROBLEM — L89.109 PRESSURE ULCER OF UNSPECIFIED PART OF BACK, UNSPECIFIED STAGE: Status: ACTIVE | Noted: 2019-02-20

## 2019-02-20 PROBLEM — J44.9 CHRONIC OBSTRUCTIVE PULMONARY DISEASE, UNSPECIFIED: Status: ACTIVE | Noted: 2019-02-20

## 2019-02-20 PROBLEM — L85.3 XEROSIS CUTIS: Status: ACTIVE | Noted: 2019-02-20

## 2019-02-20 PROCEDURE — ? TREATMENT REGIMEN

## 2019-02-20 PROCEDURE — ? COUNSELING

## 2019-02-20 PROCEDURE — 99202 OFFICE O/P NEW SF 15 MIN: CPT

## 2019-02-20 PROCEDURE — ? EDUCATIONAL RESOURCES PROVIDED

## 2019-02-20 ASSESSMENT — LOCATION ZONE DERM
LOCATION ZONE: TRUNK
LOCATION ZONE: FACE

## 2019-02-20 ASSESSMENT — LOCATION SIMPLE DESCRIPTION DERM
LOCATION SIMPLE: CHIN
LOCATION SIMPLE: LEFT CHEEK
LOCATION SIMPLE: LOWER BACK
LOCATION SIMPLE: CHEST
LOCATION SIMPLE: RIGHT CHEEK
LOCATION SIMPLE: LEFT FOREHEAD

## 2019-02-20 ASSESSMENT — LOCATION DETAILED DESCRIPTION DERM
LOCATION DETAILED: LEFT MEDIAL SUPERIOR CHEST
LOCATION DETAILED: RIGHT CENTRAL MALAR CHEEK
LOCATION DETAILED: LEFT CHIN
LOCATION DETAILED: LEFT INFERIOR MEDIAL FOREHEAD
LOCATION DETAILED: SACRAL SPINE
LOCATION DETAILED: LEFT INFERIOR CENTRAL MALAR CHEEK

## 2019-02-20 NOTE — PROCEDURE: TREATMENT REGIMEN
Samples Given: 2 tubes of Bionect.
Detail Level: Zone
Initiate Treatment: Advised to buy a doughnut to help relieve pressure and to avoid possible necrosis of the area.Advised also to apply the Bionect samples we gave her one to two times a day.

## 2019-03-20 ENCOUNTER — APPOINTMENT (RX ONLY)
Dept: URBAN - METROPOLITAN AREA CLINIC 349 | Facility: CLINIC | Age: 79
Setting detail: DERMATOLOGY
End: 2019-03-20

## 2019-03-20 DIAGNOSIS — L89 PRESSURE ULCER: ICD-10-CM | Status: IMPROVED

## 2019-03-20 DIAGNOSIS — L30.9 DERMATITIS, UNSPECIFIED: ICD-10-CM

## 2019-03-20 PROBLEM — L89.109 PRESSURE ULCER OF UNSPECIFIED PART OF BACK, UNSPECIFIED STAGE: Status: ACTIVE | Noted: 2019-03-20

## 2019-03-20 PROCEDURE — ? COUNSELING

## 2019-03-20 PROCEDURE — 99213 OFFICE O/P EST LOW 20 MIN: CPT

## 2019-03-20 PROCEDURE — ? PRESCRIPTION

## 2019-03-20 PROCEDURE — ? TREATMENT REGIMEN

## 2019-03-20 RX ORDER — ALCLOMETASONE DIPROPIONATE 0.5 MG/G
OINTMENT TOPICAL
Qty: 1 | Refills: 2 | Status: ERX | COMMUNITY
Start: 2019-03-20

## 2019-03-20 RX ADMIN — ALCLOMETASONE DIPROPIONATE: 0.5 OINTMENT TOPICAL at 18:31

## 2019-03-20 ASSESSMENT — LOCATION DETAILED DESCRIPTION DERM
LOCATION DETAILED: LEFT NASAL ALA
LOCATION DETAILED: RIGHT INFERIOR MEDIAL MALAR CHEEK
LOCATION DETAILED: SACRAL SPINE
LOCATION DETAILED: RIGHT LOWER CUTANEOUS LIP

## 2019-03-20 ASSESSMENT — LOCATION ZONE DERM
LOCATION ZONE: LIP
LOCATION ZONE: TRUNK
LOCATION ZONE: FACE
LOCATION ZONE: NOSE

## 2019-03-20 ASSESSMENT — LOCATION SIMPLE DESCRIPTION DERM
LOCATION SIMPLE: LEFT NOSE
LOCATION SIMPLE: RIGHT CHEEK
LOCATION SIMPLE: RIGHT LIP
LOCATION SIMPLE: LOWER BACK

## 2019-03-20 NOTE — PROCEDURE: TREATMENT REGIMEN
Detail Level: Zone
Continue Regimen: Advised to buy a doughnut to help relieve pressure and to avoid possible necrosis of the area.\\n\\napply the Bionect one to two times a day.
Initiate Treatment: Apply alclometasone ointment twice daily for two weeks
Samples Given: Bionect

## 2019-06-21 RX ORDER — CEFAZOLIN SODIUM/WATER 2 G/20 ML
2 SYRINGE (ML) INTRAVENOUS ONCE
Status: CANCELLED | OUTPATIENT
Start: 2019-06-21 | End: 2019-06-21

## 2019-06-24 ENCOUNTER — HOSPITAL ENCOUNTER (OUTPATIENT)
Dept: SURGERY | Age: 79
Discharge: HOME | End: 2019-06-24
Payer: MEDICARE

## 2019-06-24 ENCOUNTER — ANESTHESIA EVENT (OUTPATIENT)
Dept: SURGERY | Age: 79
End: 2019-06-24
Payer: MEDICARE

## 2019-06-24 VITALS
BODY MASS INDEX: 20.81 KG/M2 | SYSTOLIC BLOOD PRESSURE: 130 MMHG | WEIGHT: 103.25 LBS | HEIGHT: 59 IN | HEART RATE: 77 BPM | OXYGEN SATURATION: 94 % | TEMPERATURE: 98.3 F | RESPIRATION RATE: 20 BRPM | DIASTOLIC BLOOD PRESSURE: 75 MMHG

## 2019-06-24 LAB
ALBUMIN SERPL-MCNC: 3.6 G/DL (ref 3.2–4.6)
ALBUMIN/GLOB SERPL: 0.9 {RATIO} (ref 1.2–3.5)
ALP SERPL-CCNC: 82 U/L (ref 50–136)
ALT SERPL-CCNC: 9 U/L (ref 12–65)
ANION GAP SERPL CALC-SCNC: 6 MMOL/L (ref 7–16)
AST SERPL-CCNC: 20 U/L (ref 15–37)
BILIRUB SERPL-MCNC: 0.6 MG/DL (ref 0.2–1.1)
BUN SERPL-MCNC: 17 MG/DL (ref 8–23)
CALCIUM SERPL-MCNC: 9.2 MG/DL (ref 8.3–10.4)
CHLORIDE SERPL-SCNC: 101 MMOL/L (ref 98–107)
CO2 SERPL-SCNC: 30 MMOL/L (ref 21–32)
CREAT SERPL-MCNC: 1.13 MG/DL (ref 0.6–1)
GLOBULIN SER CALC-MCNC: 4 G/DL (ref 2.3–3.5)
GLUCOSE SERPL-MCNC: 109 MG/DL (ref 65–100)
HGB BLD-MCNC: 10.6 G/DL (ref 11.7–15.4)
POTASSIUM SERPL-SCNC: 3.9 MMOL/L (ref 3.5–5.1)
PROT SERPL-MCNC: 7.6 G/DL (ref 6.3–8.2)
SODIUM SERPL-SCNC: 137 MMOL/L (ref 136–145)

## 2019-06-24 PROCEDURE — 85018 HEMOGLOBIN: CPT

## 2019-06-24 PROCEDURE — 80053 COMPREHEN METABOLIC PANEL: CPT

## 2019-06-24 RX ORDER — SENNOSIDES 8.6 MG/1
1 TABLET ORAL DAILY
COMMUNITY

## 2019-06-24 RX ORDER — DICLOFENAC POTASSIUM 50 MG/1
25 TABLET, FILM COATED ORAL 3 TIMES DAILY
COMMUNITY

## 2019-06-24 RX ORDER — ACETAMINOPHEN 500 MG
1000 TABLET ORAL
COMMUNITY
End: 2020-05-18 | Stop reason: SDUPTHER

## 2019-06-24 NOTE — PERIOP NOTES
Patient confirms name and . Order to obtain consent  found in EHR and  matches case posting. Type 1B surgery,  assessment complete. Labs per surgeon: none  Labs per anesthesia protocol: hgb per protocol 19, cmp verbal order Dr. Fitz Zarco  Echo 19, cardiology note 19 viewed by Dr. Fitz Zarco while assessing patient. Dr. Fitz Zarco made aware of patient's dosage of dilaudid. Medication list  Rx bottles visualized today. Hibiclens and instructions given per hospital policy. Patient provided with and instructed on educational handouts including Guide to Surgery, Pain Management, Hand Hygiene, Blood Transfusion Education, and [facility] Brochure. Patient answered medical/surgical history questions at their best of ability. All prior to admission medications documented in [facility]. Patient instructed to continue previous medications as prescribed prior to surgery and to take the following medications the day of surgery according to anesthesia guidelines with a small sip of water: tylenol if needed, hydromorphone if needed, levothyroxine, zofran if needed, protonix, prednisone. Patient instructed to hold all vitamins 7 days prior to surgery and NSAIDS 5 days prior to surgery, patient verbalized understanding. Medications to be held: diclofenac. Patient instructed on the following:  Arrive at [facility], time of arrival to be called the day before by 1700  NPO after midnight including gum, mints, and ice chips. Responsible adult must drive patient to the hospital, stay during surgery, and patient will require supervision 24 hours after anesthesia. Use hibiclens in shower the night before surgery and on the morning of surgery. Leave all valuables (money and jewelry) at home but bring insurance card and ID on       DOS. Do not wear make-up, nail polish, lotions, cologne, perfumes, powders, or oil on skin.     Patient teach back successful and patient demonstrates knowledge of instruction.

## 2019-06-24 NOTE — PERIOP NOTES
Echocardiogram Complete (w/wo Contrast or Bubble Study)4/5/2019  Otto Parmar  Component Name Value Ref Range   LA volume 23.0 cm3   LA dimension (2D) 2.7 cm   LA Volume Index 16.3 16 - 34 ml/m2   AR .0 ms   Ao Vmax 314.0 cm/s   AV peak gradient 39.0 mmHg   AV mean gradient 24.0 mmHg   Ao VTI 64.80 cm   POLO (continuity VTI) 1.0 cm2   POLO (continuity Vmax) 1.1 cm2   Ao root diameter 3.30 cm   IVS (2D) 1.23 (A) cm   LVIDD (2D) 3.93 cm   LVIDS (2D) 2.45 cm   LVOT diameter 2.00 cm   LVOT Mean Gradient 2.0 mmHg   LVOT VTI 20.40 cm   LVOT Vmax 112.0 cm/s   LVOT peak gradient 5.0 mmHg   LVPWd (2D) 0.96 (A) cm   LV E' lateral velocity 5.6 cm/s   LV E' septal velocity 4.1 cm/s   MV E/e' Lateral Ratio 9.9     MV E/e' Septal Ratio 13.4     LVOT area 3.1 cm2   LVOT stroke volume 64.1 cm3   MV deceleration time 239.0 160 - 250 ms   MV Peak A Christiano 85.4 cm/s   MV Peak E Christiano 55.3 cm/s   E/A ratio 0.6 0.5 - 1.5    Pulmonary artery acceleration time 144.0 ms   PV Peak Velocity 96.4 cm/s   PV peak gradient 4.0 mmHg   RVIDd 2.2 cm   TR Vmax 216.0 cm/s   TR Peak Gradient 19.0 mmHg   RA R/L Diam 3.0 cm   RV R/L Diam 3.3 cm   LA R/L Diam 3.7 cm   IVC 1.90 cm   /84 mmHg   BSA 1.42 m2   Weight 1,712 oz   Height (In) 59 in   Est. RA Pressure 5.0 mmHg   Pulmonary Artery Mean Presure 14.9 mmHg   Weight 107 lb   RVSP 24.0 mmHg   Other Result Information   This result has an attachment that is not available. Result Narrative   · There is mild concentric left ventricular hypertrophy present. · The left ventricular systolic function is normal (55-65%). · Grade I (mild) left ventricular diastolic dysfunction present,   consistent with impaired relaxation. · The right ventricular systolic function is normal.  · Moderate aortic valve stenosis. · Mild aortic valve regurgitation. · Mild mitral valve regurgitation.    Status Results Details

## 2019-06-24 NOTE — ANESTHESIA PREPROCEDURE EVALUATION
Anesthetic History     PONV          Review of Systems / Medical History  Patient summary reviewed, nursing notes reviewed and pertinent labs reviewed    Pulmonary    COPD: severe      Shortness of breath      Comments: Idiopathic pulmonary fibrosis - 3L NC qhs   Neuro/Psych   Within defined limits           Cardiovascular      Valvular problems/murmurs (Pt reports long history of murmur and normal echo ): aortic stenosis            Exercise tolerance: <4 METS  Comments: Echo 2016 - normal EF, mild MR, mod. as   GI/Hepatic/Renal     GERD: well controlled           Endo/Other      Hypothyroidism: well controlled  Anemia (Hgb 9 after 4 units PRBC)     Other Findings              Physical Exam    Airway  Mallampati: II  TM Distance: 4 - 6 cm  Neck ROM: normal range of motion   Mouth opening: Normal     Cardiovascular    Rhythm: regular  Rate: normal    Murmur: Grade 3, Aortic area     Dental    Dentition: Lower partial plate and Upper partial plate  Comments: Multiple missing, but no loose   Pulmonary      Decreased breath sounds: bilateral    Rales:bilateral       Abdominal         Other Findings            Anesthetic Plan    ASA: 4  Anesthesia type: total IV anesthesia            Anesthetic plan and risks discussed with: Patient and Son / Daughter      Patient has severe pulm. Fibrosis, difficult situation, poss. Iv sedation with local, possible spinal , possible ga, the possibility of p/o vent discussed. Stated that she had this done with iv sedation and local in the past.  Also takes 4mg decadron 4 times /day. States that she cannot tolerate the pain.

## 2019-06-24 NOTE — PERIOP NOTES
Recent Results (from the past 24 hour(s))   HEMOGLOBIN    Collection Time: 06/24/19  2:40 PM   Result Value Ref Range    HGB 10.6 (L) 11.7 - 96.1 g/dL   METABOLIC PANEL, COMPREHENSIVE    Collection Time: 06/24/19  2:40 PM   Result Value Ref Range    Sodium 137 136 - 145 mmol/L    Potassium 3.9 3.5 - 5.1 mmol/L    Chloride 101 98 - 107 mmol/L    CO2 30 21 - 32 mmol/L    Anion gap 6 (L) 7 - 16 mmol/L    Glucose 109 (H) 65 - 100 mg/dL    BUN 17 8 - 23 MG/DL    Creatinine 1.13 (H) 0.6 - 1.0 MG/DL    GFR est AA 60 (L) >60 ml/min/1.73m2    GFR est non-AA 49 (L) >60 ml/min/1.73m2    Calcium 9.2 8.3 - 10.4 MG/DL    Bilirubin, total 0.6 0.2 - 1.1 MG/DL    ALT (SGPT) 9 (L) 12 - 65 U/L    AST (SGOT) 20 15 - 37 U/L    Alk.  phosphatase 82 50 - 136 U/L    Protein, total 7.6 6.3 - 8.2 g/dL    Albumin 3.6 3.2 - 4.6 g/dL    Globulin 4.0 (H) 2.3 - 3.5 g/dL    A-G Ratio 0.9 (L) 1.2 - 3.5

## 2019-06-26 NOTE — H&P
Medications:Diclofenac Sodium (25 MG); HYDROmorphone HCl (4 MG); Levothyroxine Sodium (88 MCG); Pantoprazole Sodium (40 MG);predniSONE (5 MG)      REFERRING M.D.:  Flor Mo    CC: LOW BACK PAIN    HPI:   New visit. The patient is a retired 79-year-old white female, who is having low back pain for several months. No injury. She also has right leg pain that seems to start in the buttocks and go down the anterolateral thigh to the knee. There was no particular injury. It does not seem to be getting better. She has had a previous L1 kyphoplasty in [place] in 2013 and she has had previous lumbar surgery, I think a decompression in the past.  She says the pain is constant. It is worse standing and walking, better sitting. Of note is the fact she is nondiabetic, not on blood thinners, but she does maybe have some heart disease, not sure. She has fibrosis of the lungs and is on 24 hour a day oxygen. She sees pain management and is on strong medications. Otherwise, she has been treating this with activity restriction. SH:  She does not smoke, drink, or use drugs. She is . PMH/ROS/FH/MEDS/ALLERGIES:  This form has been filled out, reviewed and included in the chart. Pertinent positive findings on ROS related to musculoskeletal problems were discussed with the patient. The non-musculoskeletal-related complaints were deferred to primary care physician. PE:  On exam, she is 4'11'', weighs 108 pounds. She is elderly and frail in appearance. Alert and oriented x3. Normal affect. Very spry. She is tender in the lumbosacral region. Deep tendon reflexes are minimal patella and Achilles. She does have a positive straight leg raising test on the right. No clonus in the ankle. Motor/sensory testing were normal.  She has generalized atrophy of her body. No asymmetric atrophy. Hip/knee range of motion is okay. Skin showed varices, age spots. No lesions.   Her pupils were constricted minimally, but reactive to light. Oropharynx clear. Neck without adenopathy or bruits. Her lungs showed diffuse wheezes. She had a large heart murmur, which she knows about. Her abdomen is soft and nontender. IMAGING:  The patient has an MRI scan of the lumbar spine, 06/03/2019. On the scan, we see the old kyphoplasty. L2 and 3 fractures are old. She has a decompression that seems to go up to L3 down to L5. She does not really any central stenosis, but I can see where there is disc compressing out into the right foramen at L4-5 and it would get the L4 nerve. L5-S1 looks fine. I do not see foraminal stenosis. There might be some lateral recess stenosis L4-5 that could get the L5 nerve. RADIOGRAPHS:  We got plain lumbar x-rays today, 06/18/2019. On these films, she has a prior L1 kyphoplasty. She has old fractures of L2 and L3. She has a new apparent fracture superior endplate of L5. She has an L4-5 spondylolisthesis. Relatively straight on the AP view. I can see where she is decompressed L4 to L5 or S1.    RADIOGRAPHIC IMPRESSION:  Degenerative changes, L1 kyphoplasty, old fractures L2 and 3, new fracture L4-5, spondylolisthesis. ASSESSMENT/PLAN:   The patient has some radicular leg pain sounds like either L4 or L5 and I do see something on her scan that could account for that, but big problem seems to be the back pain, which may since it is right at the spondylolisthesis level where she has nerve compression may exacerbate it from micro motion. I think she is having some pain from the L5 fracture and it would appear based on her history that this is chronic non-healing. I told her that we could address this with a kyphoplasty and she has had one before so she understands. I went in detail with the outpatient surgery, but there are risks. I put people to sleep to do it because I can do it better and quicker. She has bad lung disease.   We would need to check with her pulmonologist who is at [facility], Dr. Mariah Figueroa and make sure it is okay for her to be put to sleep quickly to do the procedure. Still there are risks of death, heart attack, stroke, clot leg, clot lung, new fractures, failure to get pain relief, persistent leg pain. She understands. She would like to proceed if she can be cleared for surgery. Will see if Dr. Mariah Figueroa will give her clearance to be put to sleep for an L5 kyphoplasty. FAX: Kassi Roberts M.D.              Electronically Signed By Lalita Aquino MD

## 2019-06-26 NOTE — PERIOP NOTES
Called Dr Austin Choudhury with patient's clearance from [facility], stating patient was a high risk and made recommendations which were reviewed. Dr Austin Choudhury said he would follow up with this and call if there were an new orders.   Dr Austin Choudhury, came in, after reviewing chart and said to proceed , no new orders obtained

## 2019-06-27 ENCOUNTER — APPOINTMENT (OUTPATIENT)
Dept: GENERAL RADIOLOGY | Age: 79
End: 2019-06-27
Attending: ORTHOPAEDIC SURGERY
Payer: MEDICARE

## 2019-06-27 ENCOUNTER — ANESTHESIA (OUTPATIENT)
Dept: SURGERY | Age: 79
End: 2019-06-27
Payer: MEDICARE

## 2019-06-27 ENCOUNTER — HOSPITAL ENCOUNTER (OUTPATIENT)
Age: 79
Setting detail: OUTPATIENT SURGERY
Discharge: HOME | End: 2019-06-27
Attending: ORTHOPAEDIC SURGERY | Admitting: ORTHOPAEDIC SURGERY
Payer: MEDICARE

## 2019-06-27 VITALS
DIASTOLIC BLOOD PRESSURE: 88 MMHG | SYSTOLIC BLOOD PRESSURE: 166 MMHG | OXYGEN SATURATION: 99 % | RESPIRATION RATE: 18 BRPM | HEART RATE: 78 BPM | TEMPERATURE: 98.2 F

## 2019-06-27 PROBLEM — M80.08XA VERTEBRAL FRACTURE, OSTEOPOROTIC (HCC): Status: ACTIVE | Noted: 2019-06-27

## 2019-06-27 PROCEDURE — 74011250637 HC RX REV CODE- 250/637: Performed by: ORTHOPAEDIC SURGERY

## 2019-06-27 PROCEDURE — 72100 X-RAY EXAM L-S SPINE 2/3 VWS: CPT

## 2019-06-27 PROCEDURE — 77030012894: Performed by: ORTHOPAEDIC SURGERY

## 2019-06-27 PROCEDURE — 74011250636 HC RX REV CODE- 250/636: Performed by: ANESTHESIOLOGY

## 2019-06-27 PROCEDURE — 77030002916 HC SUT ETHLN J&J -A: Performed by: ORTHOPAEDIC SURGERY

## 2019-06-27 PROCEDURE — C1713 ANCHOR/SCREW BN/BN,TIS/BN: HCPCS | Performed by: ORTHOPAEDIC SURGERY

## 2019-06-27 PROCEDURE — 76060000032 HC ANESTHESIA 0.5 TO 1 HR: Performed by: ORTHOPAEDIC SURGERY

## 2019-06-27 PROCEDURE — 74011000250 HC RX REV CODE- 250: Performed by: ORTHOPAEDIC SURGERY

## 2019-06-27 PROCEDURE — 74011250636 HC RX REV CODE- 250/636: Performed by: ORTHOPAEDIC SURGERY

## 2019-06-27 PROCEDURE — 74011636320 HC RX REV CODE- 636/320: Performed by: ORTHOPAEDIC SURGERY

## 2019-06-27 PROCEDURE — 76210000063 HC OR PH I REC FIRST 0.5 HR: Performed by: ORTHOPAEDIC SURGERY

## 2019-06-27 PROCEDURE — 74011250636 HC RX REV CODE- 250/636

## 2019-06-27 PROCEDURE — 76210000021 HC REC RM PH II 0.5 TO 1 HR: Performed by: ORTHOPAEDIC SURGERY

## 2019-06-27 PROCEDURE — 77030028759 HC KT SPN BN TAMP FF KYPH -I2: Performed by: ORTHOPAEDIC SURGERY

## 2019-06-27 PROCEDURE — 77030011218 HC DEV BIOP BN KYPH -C: Performed by: ORTHOPAEDIC SURGERY

## 2019-06-27 PROCEDURE — 76010000138 HC OR TIME 0.5 TO 1 HR: Performed by: ORTHOPAEDIC SURGERY

## 2019-06-27 DEVICE — BONE CEMENT CX01B KYPHON XPEDE W MXR US
Type: IMPLANTABLE DEVICE | Site: SPINE LUMBAR | Status: FUNCTIONAL
Brand: KYPHON® XPEDE™ BONE CEMENT AND KYPHON® MIXER PACK

## 2019-06-27 RX ORDER — CEFAZOLIN SODIUM/WATER 2 G/20 ML
2 SYRINGE (ML) INTRAVENOUS ONCE
Status: COMPLETED | OUTPATIENT
Start: 2019-06-27 | End: 2019-06-27

## 2019-06-27 RX ORDER — ALBUTEROL SULFATE 0.83 MG/ML
2.5 SOLUTION RESPIRATORY (INHALATION) AS NEEDED
Status: DISCONTINUED | OUTPATIENT
Start: 2019-06-27 | End: 2019-06-27 | Stop reason: HOSPADM

## 2019-06-27 RX ORDER — HYDROCORTISONE SODIUM SUCCINATE 100 MG/2ML
INJECTION, POWDER, FOR SOLUTION INTRAMUSCULAR; INTRAVENOUS AS NEEDED
Status: DISCONTINUED | OUTPATIENT
Start: 2019-06-27 | End: 2019-06-27 | Stop reason: HOSPADM

## 2019-06-27 RX ORDER — BUPIVACAINE HYDROCHLORIDE AND EPINEPHRINE 5; 5 MG/ML; UG/ML
INJECTION, SOLUTION EPIDURAL; INTRACAUDAL; PERINEURAL AS NEEDED
Status: DISCONTINUED | OUTPATIENT
Start: 2019-06-27 | End: 2019-06-27 | Stop reason: HOSPADM

## 2019-06-27 RX ORDER — NALOXONE HYDROCHLORIDE 0.4 MG/ML
0.1 INJECTION, SOLUTION INTRAMUSCULAR; INTRAVENOUS; SUBCUTANEOUS AS NEEDED
Status: DISCONTINUED | OUTPATIENT
Start: 2019-06-27 | End: 2019-06-27 | Stop reason: HOSPADM

## 2019-06-27 RX ORDER — LIDOCAINE HYDROCHLORIDE 10 MG/ML
0.1 INJECTION INFILTRATION; PERINEURAL AS NEEDED
Status: DISCONTINUED | OUTPATIENT
Start: 2019-06-27 | End: 2019-06-27 | Stop reason: HOSPADM

## 2019-06-27 RX ORDER — OXYCODONE HYDROCHLORIDE 5 MG/1
5 TABLET ORAL
Status: DISCONTINUED | OUTPATIENT
Start: 2019-06-27 | End: 2019-06-27 | Stop reason: HOSPADM

## 2019-06-27 RX ORDER — OXYCODONE HYDROCHLORIDE 5 MG/1
10 TABLET ORAL
Status: DISCONTINUED | OUTPATIENT
Start: 2019-06-27 | End: 2019-06-27 | Stop reason: HOSPADM

## 2019-06-27 RX ORDER — PROPOFOL 10 MG/ML
INJECTION, EMULSION INTRAVENOUS
Status: DISCONTINUED | OUTPATIENT
Start: 2019-06-27 | End: 2019-06-27 | Stop reason: HOSPADM

## 2019-06-27 RX ORDER — FENTANYL CITRATE 50 UG/ML
100 INJECTION, SOLUTION INTRAMUSCULAR; INTRAVENOUS ONCE
Status: COMPLETED | OUTPATIENT
Start: 2019-06-27 | End: 2019-06-27

## 2019-06-27 RX ORDER — MIDAZOLAM HYDROCHLORIDE 1 MG/ML
2 INJECTION, SOLUTION INTRAMUSCULAR; INTRAVENOUS
Status: DISCONTINUED | OUTPATIENT
Start: 2019-06-27 | End: 2019-06-27 | Stop reason: HOSPADM

## 2019-06-27 RX ORDER — PROPOFOL 10 MG/ML
INJECTION, EMULSION INTRAVENOUS AS NEEDED
Status: DISCONTINUED | OUTPATIENT
Start: 2019-06-27 | End: 2019-06-27 | Stop reason: HOSPADM

## 2019-06-27 RX ORDER — SODIUM CHLORIDE, SODIUM LACTATE, POTASSIUM CHLORIDE, CALCIUM CHLORIDE 600; 310; 30; 20 MG/100ML; MG/100ML; MG/100ML; MG/100ML
100 INJECTION, SOLUTION INTRAVENOUS CONTINUOUS
Status: DISCONTINUED | OUTPATIENT
Start: 2019-06-27 | End: 2019-06-27 | Stop reason: HOSPADM

## 2019-06-27 RX ORDER — LIDOCAINE HYDROCHLORIDE 20 MG/ML
INJECTION, SOLUTION EPIDURAL; INFILTRATION; INTRACAUDAL; PERINEURAL AS NEEDED
Status: DISCONTINUED | OUTPATIENT
Start: 2019-06-27 | End: 2019-06-27 | Stop reason: HOSPADM

## 2019-06-27 RX ORDER — ONDANSETRON 2 MG/ML
4 INJECTION INTRAMUSCULAR; INTRAVENOUS ONCE
Status: DISCONTINUED | OUTPATIENT
Start: 2019-06-27 | End: 2019-06-27 | Stop reason: HOSPADM

## 2019-06-27 RX ORDER — DIPHENHYDRAMINE HYDROCHLORIDE 50 MG/ML
12.5 INJECTION, SOLUTION INTRAMUSCULAR; INTRAVENOUS
Status: DISCONTINUED | OUTPATIENT
Start: 2019-06-27 | End: 2019-06-27 | Stop reason: HOSPADM

## 2019-06-27 RX ORDER — HYDROMORPHONE HYDROCHLORIDE 2 MG/ML
0.5 INJECTION, SOLUTION INTRAMUSCULAR; INTRAVENOUS; SUBCUTANEOUS
Status: DISCONTINUED | OUTPATIENT
Start: 2019-06-27 | End: 2019-06-27 | Stop reason: HOSPADM

## 2019-06-27 RX ORDER — MIDAZOLAM HYDROCHLORIDE 1 MG/ML
2 INJECTION, SOLUTION INTRAMUSCULAR; INTRAVENOUS ONCE
Status: DISCONTINUED | OUTPATIENT
Start: 2019-06-27 | End: 2019-06-27 | Stop reason: HOSPADM

## 2019-06-27 RX ORDER — ONDANSETRON 2 MG/ML
INJECTION INTRAMUSCULAR; INTRAVENOUS AS NEEDED
Status: DISCONTINUED | OUTPATIENT
Start: 2019-06-27 | End: 2019-06-27 | Stop reason: HOSPADM

## 2019-06-27 RX ADMIN — FENTANYL CITRATE 12.5 MCG: 50 INJECTION, SOLUTION INTRAMUSCULAR; INTRAVENOUS at 08:07

## 2019-06-27 RX ADMIN — FENTANYL CITRATE 12.5 MCG: 50 INJECTION, SOLUTION INTRAMUSCULAR; INTRAVENOUS at 08:01

## 2019-06-27 RX ADMIN — PROPOFOL 30 MG: 10 INJECTION, EMULSION INTRAVENOUS at 07:40

## 2019-06-27 RX ADMIN — Medication 2 G: at 07:55

## 2019-06-27 RX ADMIN — PROPOFOL 50 MCG/KG/MIN: 10 INJECTION, EMULSION INTRAVENOUS at 07:45

## 2019-06-27 RX ADMIN — Medication 3 AMPULE: at 06:49

## 2019-06-27 RX ADMIN — ONDANSETRON 4 MG: 2 INJECTION INTRAMUSCULAR; INTRAVENOUS at 08:11

## 2019-06-27 RX ADMIN — HYDROCORTISONE SODIUM SUCCINATE 100 MG: 100 INJECTION, POWDER, FOR SOLUTION INTRAMUSCULAR; INTRAVENOUS at 07:50

## 2019-06-27 RX ADMIN — LIDOCAINE HYDROCHLORIDE 50 MG: 20 INJECTION, SOLUTION EPIDURAL; INFILTRATION; INTRACAUDAL; PERINEURAL at 07:40

## 2019-06-27 RX ADMIN — SODIUM CHLORIDE, SODIUM LACTATE, POTASSIUM CHLORIDE, AND CALCIUM CHLORIDE 100 ML/HR: 600; 310; 30; 20 INJECTION, SOLUTION INTRAVENOUS at 06:50

## 2019-06-27 RX ADMIN — PROPOFOL 30 MG: 10 INJECTION, EMULSION INTRAVENOUS at 07:58

## 2019-06-27 RX ADMIN — PROPOFOL 20 MG: 10 INJECTION, EMULSION INTRAVENOUS at 08:07

## 2019-06-27 NOTE — ANESTHESIA POSTPROCEDURE EVALUATION
Procedure(s):  L5 KYPHOPLASTY/SPINOPLASTY. total IV anesthesia    Anesthesia Post Evaluation      Multimodal analgesia: multimodal analgesia used between 6 hours prior to anesthesia start to PACU discharge  Patient location during evaluation: PACU  Patient participation: complete - patient participated  Level of consciousness: awake and awake and alert  Pain management: adequate  Airway patency: patent  Anesthetic complications: no  Cardiovascular status: acceptable  Respiratory status: acceptable  Hydration status: acceptable  Post anesthesia nausea and vomiting:  controlled      Vitals Value Taken Time   /75 6/27/2019  8:43 AM   Temp 36.7 °C (98 °F) 6/27/2019  8:31 AM   Pulse 81 6/27/2019  8:44 AM   Resp 18 6/27/2019  8:43 AM   SpO2 92 % 6/27/2019  8:44 AM   Vitals shown include unvalidated device data.

## 2019-06-27 NOTE — DISCHARGE INSTRUCTIONS
KYPHOPLASTY DISCHARGE INSTRUCTIONS    General Information: This procedure is done to help with the back pain that is associated with compression fractures in the spine. The kyphoplasty involves placing a balloon into the space of the vertebrae that is fractured, blowing up the balloon, therefore realigning the broken pieces of bone, and then injecting cement into the space to strengthen the vertebrae. The pain experienced from compression fractures is caused by the vertebrae not being stabilized. The cement stabilizes the bone, therefore reducing the pain. Home Care Instructions: You can resume your regular diet and medication regimen. Do not drink alcohol, drive, or make any important legal decisions in the next 24 hours. Do not lift anything heavier than a gallon of milk, or do anything strenuous for the next 24 hours. You will notice a dressing on your lower back after your procedure. This dressing can be removed in 24 hours. Showering is acceptable in 24 hours, but you should refrain from tub baths or swimming for 5 days. Call If:     You should call your Physician if you have any bleeding other than a small spot on your bandage. Call if you have any signs of infection, fever, or increased pain at the site. Call if you should have new or worsening pain in your back, or if you lose control of your bladder or bowel. Any tingling or loss of feeling or movement in your legs should also be reported. Follow-Up Instructions: Please see your ordering doctor as he has requested.      To Reach Us:  [address] [facility] [phone]    After general anesthesia or intravenous sedation, for 24 hours or while taking prescription Narcotics:  · Limit your activities  · A responsible adult needs to be with you for the next 24 hours  · Do not drive and operate hazardous machinery  · Do not make important personal or business decisions  · Do  not drink alcoholic beverages  · If you have not urinated within 8 hours after discharge, please contact your surgeon on call. *  Please give a list of your current medications to your Primary Care Provider. *  Please update this list whenever your medications are discontinued, doses are      changed, or new medications (including over-the-counter products) are added. *  Please carry medication information at all times in case of emergency situations. These are general instructions for a healthy lifestyle:  No smoking/ No tobacco products/ Avoid exposure to second hand smoke  Surgeon General's Warning:  Quitting smoking now greatly reduces serious risk to your health. Obesity, smoking, and sedentary lifestyle greatly increases your risk for illness  A healthy diet, regular physical exercise & weight monitoring are important for maintaining a healthy lifestyle    You may be retaining fluid if you have a history of heart failure or if you experience any of the following symptoms:  Weight gain of 3 pounds or more overnight or 5 pounds in a week, increased swelling in our hands or feet or shortness of breath while lying flat in bed. Please call your doctor as soon as you notice any of these symptoms; do not wait until your next office visit. Recognize signs and symptoms of STROKE:  F-face looks uneven  A-arms unable to move or move unevenly  S-speech slurred or non-existent  T-time-call 911 as soon as signs and symptoms begin-DO NOT go       Back to bed or wait to see if you get better-TIME IS BRAIN.

## 2019-06-27 NOTE — OP NOTES
[address] [facility] 
OPERATIVE REPORT Name: 
MR#: 
: 
ACCOUNT #: 
DATE OF SERVICE: 
 
PREOPERATIVE DIAGNOSIS:  L5 osteoporotic compression fracture. POSTOPERATIVE DIAGNOSIS:  L5 osteoporotic compression fracture. PROCEDURE PERFORMED:  Bilateral L5 kyphoplasty using biplanar C-arm fluoroscopy imaging. No specimen obtained. CPT code 67 488 [id number] 07. SURGEON:  Rach Leroy. Kerry Martínez MD 
 
ASSISTANT:  None. ANESTHESIA:  Local with sedation. COMPLICATIONS:  None. SPECIMENS REMOVED:  None. IMPLANTS:  None. ESTIMATED BLOOD LOSS:  Minimal. 
 
FLUIDS:  200 mL crystalloid. DRAINS:  None. INDICATIONS:  The patient is a 78-year-old female with severe pulmonary and cardiac problems who sustained an L5 fracture and had ongoing pain that was markedly limiting her activity. She was not improving and wanted surgery. She was given the okay by her pulmonary doctor for surgery, but was felt she needed local with sedation because of her medical condition and would not qualify for spinal because of her heart problems. PROCEDURE:  The patient was brought to the operating room. After administration of sedation, IV antibiotics, and monitoring lines, we positioned her prone on the [facility] frame. At this point, her lower back and buttocks area were prepped with Betadine and sterilely draped. AP and lateral C-arms were brought in. We identified the L5 vertebral body and the pedicles. We lined them up on AP and lateral views, somewhat difficult because she had had a previous L4-S1 laminectomy and there was no midline spinous process for guidance. We marked the lateral aspect of the pedicle and then about a fingerbreadth lateral we marked that area and we anesthetized the skin with some 0.5% Marcaine with epinephrine and then inserted the needle down to the approximate pedicle area until we encountered bone and visualized this on both AP and lateral views.   We injected some Marcaine into the periosteum of this area and as we pulled the needle out, injected Marcaine all the way through the soft tissues. We let to sit for a few minutes and then we made a stab incision at our lateral marking on the spine and inserted our starting trocars down to the lateral aspect of the pedicle on both AP and lateral views, this was somewhat challenging. Once we had gotten into the right position, we inserted it down through the pedicles being careful not to breech the medial cortex and anchored it into the posterior aspect of the vertebral body. We drilled towards the anterior cortex bilaterally and inserted our inflatable tamps and inflated the tamps and got very good filling of the vertebral body and no irregularities of the endplates were seen. During this time, the methyl methacrylate was reconstituted and placed into the insertion devices. The inflatable tamps were then deflated and removed. Insertion devices were inserted and we compressed the methyl methacrylate out into the void created by the bone tamps. Once we had gotten good filling, we stopped. We let the cement harden and then we removed the bone fillers and then checked for tails and none were seen, so the trocar sleeves were removed. A final AP and lateral C-arm x-ray was obtained. Closed the skin incision with a single stitch of 3-0 nylon and dry sterile dressings were applied.   The patient was then rolled supine onto the recovery room bed having tolerated the procedure well. 
 
 
 
 
 
// 
D: 
T: 
JOB #: 
CC:  Bob Bonilla MD

## 2019-07-11 NOTE — OP NOTES
[address] [facility]  OPERATIVE REPORT    Name:  Lucía Palomares  MR#:  [id number]  :  1940  ACCOUNT #:  [de-identified]  DATE OF SERVICE:  2019    Amended document - patient demographics added; 19    PREOPERATIVE DIAGNOSIS:  L5 osteoporotic compression fracture. POSTOPERATIVE DIAGNOSIS:  L5 osteoporotic compression fracture. PROCEDURE PERFORMED:  Bilateral L5 kyphoplasty using biplanar C-arm fluoroscopy imaging. No specimen obtained. CPT code 67 488 45 07. SURGEON:  Luisito Kauffman. Lary Simmons MD     ASSISTANT:  None. ANESTHESIA:  Local with sedation. COMPLICATIONS:  None. SPECIMENS REMOVED:  None. IMPLANTS:  None. ESTIMATED BLOOD LOSS:  Minimal.     FLUIDS:  200 mL crystalloid. DRAINS:  None. INDICATIONS:  The patient is a 67-year-old female with severe pulmonary and cardiac problems who sustained an L5 fracture and had ongoing pain that was markedly limiting her activity. She was not improving and wanted surgery. She was given the okay by her pulmonary doctor for surgery, but was felt she needed local with sedation because of her medical condition and would not qualify for spinal because of her heart problems. PROCEDURE:  The patient was brought to the operating room. After administration of sedation, IV antibiotics, and monitoring lines, we positioned her prone on the [facility] frame. At this point, her lower back and buttocks area were prepped with Betadine and sterilely draped. AP and lateral C-arms were brought in. We identified the L5 vertebral body and the pedicles. We lined them up on AP and lateral views, somewhat difficult because she had had a previous L4-S1 laminectomy and there was no midline spinous process for guidance.   We marked the lateral aspect of the pedicle and then about a fingerbreadth lateral we marked that area and we anesthetized the skin with some 0.5% Marcaine with epinephrine and then inserted the needle down to the approximate pedicle area until we encountered bone and visualized this on both AP and lateral views. We injected some Marcaine into the periosteum of this area and as we pulled the needle out, injected Marcaine all the way through the soft tissues. We let to sit for a few minutes and then we made a stab incision at our lateral marking on the spine and inserted our starting trocars down to the lateral aspect of the pedicle on both AP and lateral views, this was somewhat challenging. Once we had gotten into the right position, we inserted it down through the pedicles being careful not to breech the medial cortex and anchored it into the posterior aspect of the vertebral body. We drilled towards the anterior cortex bilaterally and inserted our inflatable tamps and inflated the tamps and got very good filling of the vertebral body and no irregularities of the endplates were seen. During this time, the methyl methacrylate was reconstituted and placed into the insertion devices. The inflatable tamps were then deflated and removed. Insertion devices were inserted and we compressed the methyl methacrylate out into the void created by the bone tamps. Once we had gotten good filling, we stopped. We let the cement harden and then we removed the bone fillers and then checked for tails and none were seen, so the trocar sleeves were removed. A final AP and lateral C-arm x-ray was obtained. Closed the skin incision with a single stitch of 3-0 nylon and dry sterile dressings were applied. The patient was then rolled supine onto the recovery room bed having tolerated the procedure well.       Roderick Sweeney MD      [id number]  D:  06/27/2019 8:40  T:  07/11/2019 17:14  JOB #:  [id number]  CC:  Olamide Burciaga MD

## 2020-04-28 ENCOUNTER — APPOINTMENT (RX ONLY)
Dept: URBAN - METROPOLITAN AREA CLINIC 349 | Facility: CLINIC | Age: 80
Setting detail: DERMATOLOGY
End: 2020-04-28

## 2020-04-28 DIAGNOSIS — H61.03 CHONDRITIS OF EXTERNAL EAR: ICD-10-CM

## 2020-04-28 DIAGNOSIS — L89 PRESSURE ULCER: ICD-10-CM

## 2020-04-28 PROBLEM — L89.319 PRESSURE ULCER OF RIGHT BUTTOCK, UNSPECIFIED STAGE: Status: ACTIVE | Noted: 2020-04-28

## 2020-04-28 PROBLEM — H61.031 CHONDRITIS OF RIGHT EXTERNAL EAR: Status: ACTIVE | Noted: 2020-04-28

## 2020-04-28 PROBLEM — D48.5 NEOPLASM OF UNCERTAIN BEHAVIOR OF SKIN: Status: ACTIVE | Noted: 2020-04-28

## 2020-04-28 PROCEDURE — A4550 SURGICAL TRAYS: HCPCS

## 2020-04-28 PROCEDURE — ? OTHER

## 2020-04-28 PROCEDURE — 99213 OFFICE O/P EST LOW 20 MIN: CPT | Mod: 25

## 2020-04-28 PROCEDURE — ? PATHOLOGY BILLING

## 2020-04-28 PROCEDURE — ? BIOPSY BY SHAVE METHOD

## 2020-04-28 PROCEDURE — ? COUNSELING

## 2020-04-28 PROCEDURE — ? TREATMENT REGIMEN

## 2020-04-28 PROCEDURE — 88305 TISSUE EXAM BY PATHOLOGIST: CPT

## 2020-04-28 PROCEDURE — 69100 BIOPSY OF EXTERNAL EAR: CPT

## 2020-04-28 ASSESSMENT — LOCATION SIMPLE DESCRIPTION DERM
LOCATION SIMPLE: RIGHT BUTTOCK
LOCATION SIMPLE: RIGHT EAR
LOCATION SIMPLE: RIGHT EAR

## 2020-04-28 ASSESSMENT — LOCATION ZONE DERM
LOCATION ZONE: EAR
LOCATION ZONE: TRUNK
LOCATION ZONE: EAR

## 2020-04-28 ASSESSMENT — LOCATION DETAILED DESCRIPTION DERM
LOCATION DETAILED: RIGHT BUTTOCK
LOCATION DETAILED: RIGHT SUPERIOR HELIX
LOCATION DETAILED: RIGHT SUPERIOR HELIX

## 2020-04-28 NOTE — PROCEDURE: PATHOLOGY BILLING
Immunohistochemistry ([id number]) billing is not performed here. Please use the Immunohistochemistry Stain Billing plan to accomplish this. Immunohistochemistry ([id number] and [id number]) billing is not performed here. Please use the Immunohistochemistry Stain Billing plan to accomplish this.

## 2020-04-28 NOTE — PROCEDURE: TREATMENT REGIMEN
Otc Regimen: Vaseline daily \\nDonut pillow to sit on
Initiate Treatment: Vaselin
Detail Level: Zone

## 2020-04-28 NOTE — PROCEDURE: BIOPSY BY SHAVE METHOD
Anesthesia Volume In Cc (Will Not Render If 0): 0.6
Curettage Text: The wound bed was treated with curettage after the biopsy was performed.
Path Notes (To The Dermatopathologist): Check margins
Billing Type: Third-Party Bill
Hide Topical Anesthesia?: No
Electrodesiccation And Curettage Text: The wound bed was treated with electrodesiccation and curettage after the biopsy was performed.
Post-Care Instructions: I reviewed with the patient in detail post-care instructions. Patient is to keep the biopsy site dry overnight, and then apply bacitracin twice daily until healed. Patient may apply hydrogen peroxide soaks to remove any crusting.
Detail Level: Detailed
Dressing: Band-Aid
Size Of Lesion In Cm: 0
Biopsy Method: Dermablade
Anesthesia Type: 2% lidocaine with epinephrine
Wound Care: Vaseline
Was A Bandage Applied: Yes
Type Of Destruction Used: Curettage
Accession #: [id number]
Electrodesiccation Text: The wound bed was treated with electrodesiccation after the biopsy was performed.
Biopsy Type: H and E
Consent: Written consent was obtained and risks were reviewed including but not limited to scarring, infection, bleeding, scabbing, incomplete removal, nerve damage and allergy to anesthesia.
Cryotherapy Text: The wound bed was treated with cryotherapy after the biopsy was performed.
Depth Of Biopsy: dermis
Hemostasis: Thermocautery
Silver Nitrate Text: The wound bed was treated with silver nitrate after the biopsy was performed.
Notification Instructions: Patient will be notified of biopsy results. However, patient instructed to call the office if not contacted within 2 weeks.
Information: Selecting Yes will display possible errors in your note based on the variables you have selected. This validation is only offered as a suggestion for you. PLEASE NOTE THAT THE VALIDATION TEXT WILL BE REMOVED WHEN YOU FINALIZE YOUR NOTE. IF YOU WANT TO FAX A PRELIMINARY NOTE YOU WILL NEED TO TOGGLE THIS TO 'NO' IF YOU DO NOT WANT IT IN YOUR FAXED NOTE.

## 2020-04-28 NOTE — HPI: SKIN LESIONS
How Severe Is Your Skin Lesion?: mild
Have Your Skin Lesions Been Treated?: not been treated
Is This A New Presentation, Or A Follow-Up?: Skin Lesions
Additional History: Patient is here to have lesion on her ear and buttock looked at. Patient states they have both been present for several months. The spot on her ear is rough and scaly and it actually pealed off this morning. Patient states the spot on her buttock appears to be a wound and she states it burns. Patient denies family or personal history of melanoma.

## 2020-04-28 NOTE — PROCEDURE: OTHER
Detail Level: Zone
Other (Free Text): Patient has been putting neosporin on lesion. Patient sleeps on this side. Patient states the lesion is extremely tender.\\n\\nPatient is unable to stop her oxygen so we will cauterize with thermal cautery.
Note Text (......Xxx Chief Complaint.): This diagnosis correlates with the
Other (Free Text): Patient sits most of the day. Patient is in a wheelchair. \\n\\nPatient has been struggling with this for a while. Janna recommended sending patient to the wound clinic since ulcer isnt better and is starting to open up. Janna discussed in detail. Patient and son would like to try some at home treatments before going to the wound clinic because it is very hard for her to get out of the house. We will recheck in one month and can refer her to the wound clinic then if not better. Call if it worsens. \\n\\nPatient states hospice gave her a cream to help but it hasn’t helped much.\\n\\nEarly Stage 2 \\n\\nRecommended donut pillow to relieve pressure and Vaseline

## 2021-08-03 PROBLEM — K92.2 GI BLEED: Status: RESOLVED | Noted: 2017-06-25 | Resolved: 2021-08-03

## (undated) DEVICE — SUTURE ETHLN SZ 2-0 L18IN NONABSORBABLE BLK L26MM PS 3/8 585H

## (undated) DEVICE — KENDALL RADIOLUCENT FOAM MONITORING ELECTRODE RECTANGULAR SHAPE: Brand: KENDALL

## (undated) DEVICE — (D)PREP SKN CHLRAPRP APPL 26ML -- CONVERT TO ITEM 371833

## (undated) DEVICE — CONNECTOR TBNG OD5-7MM O2 END DISP

## (undated) DEVICE — BLOCK BITE AD 60FR W/ VELC STRP ADDRESSES MOST PT AND

## (undated) DEVICE — NEEDLE HYPO 21GA L1.5IN INTRAMUSCULAR S STL LATCH BVL UP

## (undated) DEVICE — 3M™ TEGADERM™ TRANSPARENT FILM DRESSING FRAME STYLE, 1624W, 2-3/8 IN X 2-3/4 IN (6 CM X 7 CM), 100/CT 4CT/CASE: Brand: 3M™ TEGADERM™

## (undated) DEVICE — PACK PROCEDURE SURG POST LAMINECTOMY CDS

## (undated) DEVICE — BONE TAMP KIT KPX203PB FF X2 20/3 1 STP: Brand: KYPHOPAK™ TRAY

## (undated) DEVICE — AMD ANTIMICROBIAL NON-ADHERENT PAD,0.2% POLYHEXAMETHYLENE BIGUANIDE HCI (PHMB): Brand: TELFA

## (undated) DEVICE — SYR LR LCK 1ML GRAD NSAF 30ML --

## (undated) DEVICE — DRAPE XR C ARM 41X74IN LF --

## (undated) DEVICE — BONE BIOPSY DEVICE F05A BBD SIZE 3: Brand: MEDTRONIC REUSABLE INSTRUMENTS

## (undated) DEVICE — INTENDED FOR TISSUE SEPARATION, AND OTHER PROCEDURES THAT REQUIRE A SHARP SURGICAL BLADE TO PUNCTURE OR CUT.: Brand: BARD-PARKER SAFETY BLADES SIZE 15, STERILE

## (undated) DEVICE — TELFA NON-ADHERENT ABSORBENT DRESSING: Brand: TELFA

## (undated) DEVICE — CLIPPING DEVICE: Brand: RESOLUTION CLIP

## (undated) DEVICE — 1010 S-DRAPE TOWEL DRAPE 10/BX: Brand: STERI-DRAPE™

## (undated) DEVICE — CANNULA NSL ORAL AD FOR CAPNOFLEX CO2 O2 AIRLFE